# Patient Record
Sex: FEMALE | Race: WHITE | NOT HISPANIC OR LATINO | URBAN - METROPOLITAN AREA
[De-identification: names, ages, dates, MRNs, and addresses within clinical notes are randomized per-mention and may not be internally consistent; named-entity substitution may affect disease eponyms.]

---

## 2020-02-20 ENCOUNTER — APPOINTMENT (OUTPATIENT)
Dept: WOMENS IMAGING | Facility: HOSPITAL | Age: 60
End: 2020-02-20

## 2020-02-20 PROCEDURE — 77063 BREAST TOMOSYNTHESIS BI: CPT | Performed by: RADIOLOGY

## 2020-02-20 PROCEDURE — 77067 SCR MAMMO BI INCL CAD: CPT | Performed by: RADIOLOGY

## 2022-06-16 ENCOUNTER — APPOINTMENT (OUTPATIENT)
Dept: WOMENS IMAGING | Facility: HOSPITAL | Age: 62
End: 2022-06-16

## 2022-06-16 PROCEDURE — 77067 SCR MAMMO BI INCL CAD: CPT | Performed by: RADIOLOGY

## 2022-06-16 PROCEDURE — 77063 BREAST TOMOSYNTHESIS BI: CPT | Performed by: RADIOLOGY

## 2022-07-07 ENCOUNTER — APPOINTMENT (OUTPATIENT)
Dept: WOMENS IMAGING | Facility: HOSPITAL | Age: 62
End: 2022-07-07

## 2022-07-07 PROCEDURE — G0279 TOMOSYNTHESIS, MAMMO: HCPCS | Performed by: RADIOLOGY

## 2022-07-07 PROCEDURE — 77065 DX MAMMO INCL CAD UNI: CPT | Performed by: RADIOLOGY

## 2022-07-07 PROCEDURE — 77061 BREAST TOMOSYNTHESIS UNI: CPT | Performed by: RADIOLOGY

## 2022-07-07 PROCEDURE — 76642 ULTRASOUND BREAST LIMITED: CPT | Performed by: RADIOLOGY

## 2022-09-02 ENCOUNTER — OFFICE VISIT (OUTPATIENT)
Dept: INTERNAL MEDICINE | Facility: CLINIC | Age: 62
End: 2022-09-02

## 2022-09-02 VITALS
HEART RATE: 78 BPM | OXYGEN SATURATION: 98 % | RESPIRATION RATE: 16 BRPM | HEIGHT: 65 IN | TEMPERATURE: 97 F | DIASTOLIC BLOOD PRESSURE: 80 MMHG | SYSTOLIC BLOOD PRESSURE: 118 MMHG | BODY MASS INDEX: 33.15 KG/M2 | WEIGHT: 199 LBS

## 2022-09-02 DIAGNOSIS — L98.9 LESION OF FACE: ICD-10-CM

## 2022-09-02 DIAGNOSIS — E78.5 HYPERLIPIDEMIA, UNSPECIFIED HYPERLIPIDEMIA TYPE: ICD-10-CM

## 2022-09-02 DIAGNOSIS — Z72.0 TOBACCO ABUSE: ICD-10-CM

## 2022-09-02 DIAGNOSIS — E55.9 VITAMIN D DEFICIENCY: Primary | ICD-10-CM

## 2022-09-02 DIAGNOSIS — Z12.2 ENCOUNTER FOR SCREENING FOR LUNG CANCER: ICD-10-CM

## 2022-09-02 DIAGNOSIS — R73.03 PREDIABETES: ICD-10-CM

## 2022-09-02 DIAGNOSIS — Z12.11 SCREENING FOR COLON CANCER: ICD-10-CM

## 2022-09-02 PROBLEM — M48.02 CERVICAL SPINAL STENOSIS: Status: ACTIVE | Noted: 2022-09-02

## 2022-09-02 PROBLEM — K21.9 GERD (GASTROESOPHAGEAL REFLUX DISEASE): Status: ACTIVE | Noted: 2022-09-02

## 2022-09-02 PROBLEM — M50.30 DDD (DEGENERATIVE DISC DISEASE), CERVICAL: Status: ACTIVE | Noted: 2022-09-02

## 2022-09-02 PROBLEM — H25.13 AGE-RELATED NUCLEAR CATARACT OF BOTH EYES: Status: ACTIVE | Noted: 2021-10-12

## 2022-09-02 PROCEDURE — 99203 OFFICE O/P NEW LOW 30 MIN: CPT | Performed by: NURSE PRACTITIONER

## 2022-09-02 RX ORDER — LATANOPROST 50 UG/ML
1 SOLUTION/ DROPS OPHTHALMIC DAILY
COMMUNITY
Start: 2015-03-30

## 2022-09-02 RX ORDER — ERGOCALCIFEROL 1.25 MG/1
50000 CAPSULE ORAL WEEKLY
COMMUNITY
End: 2022-09-02

## 2022-09-02 RX ORDER — FAMOTIDINE 10 MG
1 TABLET ORAL 2 TIMES DAILY
COMMUNITY

## 2022-09-02 NOTE — PROGRESS NOTES
Subjective   Juli Monteiro is a 61 y.o. female. Patient is here today for   Chief Complaint   Patient presents with   • Vitamin D Deficiency   • Elevated A1C   • Hyperlipidemia          Vitals:    09/02/22 0900   BP: 118/80   Pulse: 78   Resp: 16   Temp: 97 °F (36.1 °C)   SpO2: 98%     Body mass index is 33.12 kg/m².  The following portions of the patient's history were reviewed and updated as appropriate: allergies, current medications, past family history, past medical history, past social history, past surgical history and problem list.    Past Medical History:   Diagnosis Date   • Hyperlipidemia       Allergies   Allergen Reactions   • Celecoxib Hives   • Doxycycline Nausea And Vomiting   • Sulfa Antibiotics Other (See Comments)     Allergic to Celebrex  Other reaction(s): Other (See Comments)  Allergic to Celebrex        Social History     Socioeconomic History   • Marital status: Unknown   Tobacco Use   • Smoking status: Current Every Day Smoker     Packs/day: 1.00     Years: 40.00     Pack years: 40.00     Types: Cigarettes   • Smokeless tobacco: Never Used   Substance and Sexual Activity   • Alcohol use: Never        Current Outpatient Medications:   •  famotidine (PEPCID) 10 MG tablet, Take 1 tablet by mouth 2 (Two) Times a Day., Disp: , Rfl:   •  latanoprost (XALATAN) 0.005 % ophthalmic solution, Apply 1 drop to eye(s) as directed by provider Daily., Disp: , Rfl:   •  vitamin D3 125 MCG (5000 UT) capsule capsule, Take 5,000 Units by mouth Daily., Disp: , Rfl:      Objective     History of Present Illness  Juli is a 61 year old female new patient who is here to establish care. She has not seen a PCP in many years but previously saw Dr Oseguera and was last seen in 2015. She established care with gyn at [facility] and had labs drawn a few months ago. A1C was in the prediabetic range, cholesterol was elevated, and vitamin d was low. She was referred to primary care for further eval. I do not have records to  review. Pt was given vitamin D weekly for 8 weeks, then started on daily otc vitamin d. I do not have the results to review.  She started a diabetic diet and lost 16 lbs.   She is a long time smoker and wants to quit but is not ready yet. Discussed smoking cessation.   She would like a referral to dermatology for lesion on her face and neck and for a skin check     Review of Systems   Constitutional: Negative.    HENT: Positive for congestion, postnasal drip and rhinorrhea.    Respiratory: Negative.    Cardiovascular: Negative.    Gastrointestinal: Negative.    Endocrine: Negative.    Genitourinary: Negative.    Allergic/Immunologic: Positive for environmental allergies.   Neurological: Positive for numbness (right foot ).       Physical Exam  Vitals and nursing note reviewed.   Constitutional:       General: She is not in acute distress.     Appearance: Normal appearance. She is well-developed and well-groomed.   Cardiovascular:      Rate and Rhythm: Normal rate and regular rhythm.   Pulmonary:      Effort: Pulmonary effort is normal.      Breath sounds: Normal breath sounds.   Neurological:      Mental Status: She is alert.   Psychiatric:         Behavior: Behavior is cooperative.         ASSESSMENT     Problems Addressed this Visit     Tobacco abuse    Relevant Orders     CT Chest Low Dose Cancer Screening WO      Other Visit Diagnoses     Vitamin D deficiency    -  Primary    Prediabetes        Hyperlipidemia, unspecified hyperlipidemia type        Encounter for screening for lung cancer        Relevant Orders     CT Chest Low Dose Cancer Screening WO    Screening for colon cancer        Relevant Orders    Ambulatory Referral to General Surgery    Lesion of face        Relevant Orders    Ambulatory Referral to Dermatology      Diagnoses       Codes Comments    Vitamin D deficiency    -  Primary ICD-10-CM: E55.9  ICD-9-CM: 268.9     Prediabetes     ICD-10-CM: R73.03  ICD-9-CM: 790.29     Hyperlipidemia,  unspecified hyperlipidemia type     ICD-10-CM: E78.5  ICD-9-CM: 272.4     Tobacco abuse     ICD-10-CM: Z72.0  ICD-9-CM: 305.1     Encounter for screening for lung cancer     ICD-10-CM: Z12.2  ICD-9-CM: V76.0     Screening for colon cancer     ICD-10-CM: Z12.11  ICD-9-CM: V76.51     Lesion of face     ICD-10-CM: L98.9  ICD-9-CM: 709.9           PLAN  Will obtain labs , OV, and mammogram from [facility]  Discussed smoking cessation  Continue otc vitamin d   Recommend exercise as tolerated  Continue diabetic diet  Colonoscopy screening referral placed  Referred to dermatology for skin eval per patient request    Return in about 6 months (around 3/2/2023) for Annual physical, with labs. including vitamin d , a1c, lipid panel, cmp, cbc , UA, and hepatitis c screening

## 2022-09-07 ENCOUNTER — TELEPHONE (OUTPATIENT)
Dept: INTERNAL MEDICINE | Facility: CLINIC | Age: 62
End: 2022-09-07

## 2022-09-07 NOTE — TELEPHONE ENCOUNTER
----- Message from MANDEEP Thomas sent at 9/7/2022 11:39 AM EDT -----  Reviewed mammogram from [facility] , recommend a 6 month follow up diagnostic mammogram. Please make sure the patient has a scheduled follow up with [facility]

## 2022-09-15 ENCOUNTER — TELEPHONE (OUTPATIENT)
Dept: INTERNAL MEDICINE | Facility: CLINIC | Age: 62
End: 2022-09-15

## 2022-09-15 ENCOUNTER — HOSPITAL ENCOUNTER (OUTPATIENT)
Dept: CT IMAGING | Facility: HOSPITAL | Age: 62
Discharge: HOME | End: 2022-09-15
Admitting: NURSE PRACTITIONER

## 2022-09-15 PROCEDURE — 71271 CT THORAX LUNG CANCER SCR C-: CPT

## 2022-09-15 NOTE — TELEPHONE ENCOUNTER
Please call Juli and let her know that low dose CT screening showed No suspicious findings for pulmonary malignancy. There was a small 2mm pulmonary nodule in the right middle lobe. Recommend low-dose  screening in one year

## 2023-01-10 ENCOUNTER — APPOINTMENT (OUTPATIENT)
Dept: WOMENS IMAGING | Facility: HOSPITAL | Age: 63
End: 2023-01-10
Payer: COMMERCIAL

## 2023-01-10 PROCEDURE — 76642 ULTRASOUND BREAST LIMITED: CPT | Performed by: RADIOLOGY

## 2023-01-10 PROCEDURE — 77065 DX MAMMO INCL CAD UNI: CPT | Performed by: RADIOLOGY

## 2023-01-10 PROCEDURE — 77061 BREAST TOMOSYNTHESIS UNI: CPT | Performed by: RADIOLOGY

## 2023-01-10 PROCEDURE — G0279 TOMOSYNTHESIS, MAMMO: HCPCS | Performed by: RADIOLOGY

## 2023-01-27 ENCOUNTER — OFFICE VISIT (OUTPATIENT)
Dept: INTERNAL MEDICINE | Facility: CLINIC | Age: 63
End: 2023-01-27
Payer: COMMERCIAL

## 2023-01-27 VITALS
HEIGHT: 65 IN | SYSTOLIC BLOOD PRESSURE: 118 MMHG | OXYGEN SATURATION: 98 % | DIASTOLIC BLOOD PRESSURE: 72 MMHG | WEIGHT: 191.4 LBS | HEART RATE: 82 BPM | BODY MASS INDEX: 31.89 KG/M2 | TEMPERATURE: 98.3 F

## 2023-01-27 DIAGNOSIS — Z72.0 TOBACCO ABUSE: ICD-10-CM

## 2023-01-27 DIAGNOSIS — R05.9 COUGH, UNSPECIFIED TYPE: Primary | ICD-10-CM

## 2023-01-27 LAB
EXPIRATION DATE: NORMAL
FLUAV AG UPPER RESP QL IA.RAPID: NOT DETECTED
FLUBV AG UPPER RESP QL IA.RAPID: NOT DETECTED
INTERNAL CONTROL: NORMAL
Lab: NORMAL
SARS-COV-2 AG UPPER RESP QL IA.RAPID: NOT DETECTED

## 2023-01-27 PROCEDURE — 99214 OFFICE O/P EST MOD 30 MIN: CPT | Performed by: STUDENT IN AN ORGANIZED HEALTH CARE EDUCATION/TRAINING PROGRAM

## 2023-01-27 PROCEDURE — 87428 SARSCOV & INF VIR A&B AG IA: CPT | Performed by: STUDENT IN AN ORGANIZED HEALTH CARE EDUCATION/TRAINING PROGRAM

## 2023-01-27 RX ORDER — VARENICLINE TARTRATE 25 MG
KIT ORAL
Qty: 53 TABLET | Refills: 0 | Status: SHIPPED | OUTPATIENT
Start: 2023-01-27

## 2023-01-27 RX ORDER — ALBUTEROL SULFATE 90 UG/1
2 AEROSOL, METERED RESPIRATORY (INHALATION) EVERY 4 HOURS PRN
Qty: 6.7 G | Refills: 0 | Status: SHIPPED | OUTPATIENT
Start: 2023-01-27

## 2023-01-27 RX ORDER — BENZONATATE 100 MG/1
100 CAPSULE ORAL 3 TIMES DAILY PRN
Qty: 30 CAPSULE | Refills: 0 | Status: SHIPPED | OUTPATIENT
Start: 2023-01-27 | End: 2023-02-16

## 2023-01-27 NOTE — PROGRESS NOTES
"  Andres Casarez M.D.  Internal Medicine  [facility]  [address], [address]  [place] [address]  [phone]      Chief Complaint  Cough, Nasal Congestion, and Sore Throat    SUBJECTIVE    History of Present Illness    Juli Monteiro is a 62 y.o. female with obesity, tobacco use who presents to the office today as an established patient of Ethel KAUFMAN here today for an acute care visit.     Symptoms started on Wednesday. Symptoms worsened Wednesday night with cough, nasal congestion, sore throat, headache. She is concerned she will get bronchitis and needs a Z-pack. She felt sh had a fever, did not feel well. Yesterday she had diarrhea. She has \"immature sinuses\". She presents today for yellow mucus, cough, congestion, headache. She is taking Dayquil, nyquil, allergy medicine. This helps for 2-3 hours. She avoids NSAIDs because of AGUAYO. States mucinex makes things worse.    Left foot gets numb at night on the top. Worse if she has sugar.  She thinks this is from her pre-diabetes. She is interested in starting metformin and wants to talk to her PCP about this. .     She smokes 1 ppd. She wants to quit by her birthday ([birth year]). She has never had success with quitting before. Nicotine patches and gum did not help. Interested in Chantix.       Review of Systems    Allergies   Allergen Reactions   • Celecoxib Hives   • Doxycycline Nausea And Vomiting   • Sulfa Antibiotics Other (See Comments)     Allergic to Celebrex  Other reaction(s): Other (See Comments)  Allergic to Celebrex          Outpatient Medications Marked as Taking for the 1/27/23 encounter (Office Visit) with Andres Casarez MD   Medication Sig Dispense Refill   • famotidine (PEPCID) 10 MG tablet Take 1 tablet by mouth 2 (Two) Times a Day.     • latanoprost (XALATAN) 0.005 % ophthalmic solution Apply 1 drop to eye(s) as directed by provider Daily.     • vitamin D3 125 MCG (5000 UT) capsule capsule Take 5,000 Units by mouth Daily.   " "       Past Medical History:   Diagnosis Date   • Cataract    • Cholelithiasis 2011    removed   • History of medical problems pcos   • Hyperlipidemia    • Irritable bowel syndrome    • Visual impairment      Past Surgical History:   Procedure Laterality Date   • APPENDECTOMY     •  SECTION     • CHOLECYSTECTOMY     • COLONOSCOPY     • EYE SURGERY       Family History   Problem Relation Age of Onset   • Cancer Mother    • Vision loss Mother    • Cancer Father    • Hearing loss Father    • Heart disease Father    • Vision loss Maternal Grandmother     reports that she has been smoking cigarettes. She has a 40.00 pack-year smoking history. She has never used smokeless tobacco. She reports that she does not currently use alcohol. She reports that she does not use drugs.    OBJECTIVE    Vital Signs:   /72   Pulse 82   Temp 98.3 °F (36.8 °C)   Ht 165.1 cm (65\")   Wt 86.8 kg (191 lb 6.4 oz)   SpO2 98%   BMI 31.85 kg/m²     Physical Exam  Constitutional:       Appearance: Normal appearance. She is normal weight.   HENT:      Right Ear: Tympanic membrane normal.      Left Ear: Tympanic membrane normal.      Nose: Nose normal.      Mouth/Throat:      Mouth: Mucous membranes are moist.      Pharynx: Oropharynx is clear. No posterior oropharyngeal erythema.   Cardiovascular:      Rate and Rhythm: Normal rate and regular rhythm.      Heart sounds: Normal heart sounds. No murmur heard.  Pulmonary:      Effort: Pulmonary effort is normal.      Breath sounds: Normal breath sounds.   Skin:     General: Skin is warm and dry.   Neurological:      Mental Status: She is alert.   Psychiatric:         Mood and Affect: Mood normal.         Behavior: Behavior normal.         Thought Content: Thought content normal.            The following data was reviewed by: Andres Casarez MD on 2023:    No recent lab data to review            Data reviewed: Previous PCP notes             ASSESSMENT & PLAN "     Diagnoses and all orders for this visit:    1. Cough, unspecified type (Primary)  -     POCT SARS-CoV-2 Antigen ANGEL + Flu  -     benzonatate (Tessalon Perles) 100 MG capsule; Take 1 capsule by mouth 3 (Three) Times a Day As Needed for Cough.  Dispense: 30 capsule; Refill: 0  -     albuterol sulfate  (90 Base) MCG/ACT inhaler; Inhale 2 puffs Every 4 (Four) Hours As Needed for Wheezing.  Dispense: 6.7 g; Refill: 0    2. Tobacco abuse  -     Varenicline Tartrate 0.5 MG X 11 & 1 MG X 42 tablet; Take 0.5 mg one daily on days 1-3 and and 0.5 mg twice daily on days 4-7.Then 1 mg twice daily for a total of 12 weeks.  Dispense: 53 tablet; Refill: 0      Discussed expected course for upper respiratory infection.  Discussed that this will likely improve on its own without antibiotics.  If patient has new or worsening symptoms or failure symptoms to improve she will reach out to us.  Patient is interested in smoking cessation today for her health.  She would like to quit by her birthday next year.  She is interested in Chantix.    Health Maintenance Due   Topic Date Due   • COLORECTAL CANCER SCREENING  Never done   • Pneumococcal Vaccine 0-64 (1 - PCV) Never done   • ZOSTER VACCINE (2 of 3) 11/08/2016   • INFLUENZA VACCINE  08/01/2022   • HEPATITIS C SCREENING  Never done   • ANNUAL PHYSICAL  Never done   • PAP SMEAR  Never done        Follow Up  No follow-ups on file.    Patient/family had no further questions at this time and verbalized understanding of the plan discussed today.

## 2023-02-06 DIAGNOSIS — R73.03 PREDIABETES: ICD-10-CM

## 2023-02-06 DIAGNOSIS — Z00.00 ANNUAL PHYSICAL EXAM: ICD-10-CM

## 2023-02-06 DIAGNOSIS — E78.5 HYPERLIPIDEMIA, UNSPECIFIED HYPERLIPIDEMIA TYPE: ICD-10-CM

## 2023-02-06 DIAGNOSIS — E55.9 VITAMIN D DEFICIENCY: Primary | ICD-10-CM

## 2023-02-16 ENCOUNTER — OFFICE VISIT (OUTPATIENT)
Dept: INTERNAL MEDICINE | Facility: CLINIC | Age: 63
End: 2023-02-16
Payer: COMMERCIAL

## 2023-02-16 VITALS
TEMPERATURE: 98.3 F | SYSTOLIC BLOOD PRESSURE: 112 MMHG | WEIGHT: 189.8 LBS | OXYGEN SATURATION: 98 % | BODY MASS INDEX: 31.62 KG/M2 | DIASTOLIC BLOOD PRESSURE: 62 MMHG | HEART RATE: 81 BPM | HEIGHT: 65 IN

## 2023-02-16 DIAGNOSIS — Z12.11 COLON CANCER SCREENING: ICD-10-CM

## 2023-02-16 DIAGNOSIS — R73.03 PREDIABETES: Primary | ICD-10-CM

## 2023-02-16 DIAGNOSIS — E78.2 MODERATE MIXED HYPERLIPIDEMIA NOT REQUIRING STATIN THERAPY: ICD-10-CM

## 2023-02-16 DIAGNOSIS — K21.9 GASTROESOPHAGEAL REFLUX DISEASE, UNSPECIFIED WHETHER ESOPHAGITIS PRESENT: ICD-10-CM

## 2023-02-16 DIAGNOSIS — J20.9 ACUTE BRONCHITIS, UNSPECIFIED ORGANISM: ICD-10-CM

## 2023-02-16 PROCEDURE — 99214 OFFICE O/P EST MOD 30 MIN: CPT | Performed by: NURSE PRACTITIONER

## 2023-02-16 RX ORDER — AZITHROMYCIN 250 MG/1
TABLET, FILM COATED ORAL
Qty: 6 TABLET | Refills: 0 | Status: SHIPPED | OUTPATIENT
Start: 2023-02-16

## 2023-02-16 NOTE — PROGRESS NOTES
Subjective   Juli Monteiro is a 62 y.o. female. Patient is here today for   Chief Complaint   Patient presents with   • Annual Exam     CPE. Review of Medical Issues          Vitals:    02/16/23 1502   BP: 112/62   Pulse: 81   Temp: 98.3 °F (36.8 °C)   SpO2: 98%     Body mass index is 31.58 kg/m².    The following portions of the patient's history were reviewed and updated as appropriate: allergies, current medications, past family history, past medical history, past social history, past surgical history and problem list.    Past Medical History:   Diagnosis Date   • Cataract 2017   • Cholelithiasis 2011    removed   • History of medical problems pcos   • Hyperlipidemia    • Irritable bowel syndrome 2009   • Visual impairment 2003      Allergies   Allergen Reactions   • Celecoxib Hives   • Doxycycline Nausea And Vomiting   • Sulfa Antibiotics Other (See Comments)     Allergic to Celebrex  Other reaction(s): Other (See Comments)  Allergic to Celebrex        Social History     Socioeconomic History   • Marital status:    Tobacco Use   • Smoking status: Every Day     Packs/day: 1.00     Years: 40.00     Pack years: 40.00     Types: Cigarettes   • Smokeless tobacco: Never   Substance and Sexual Activity   • Alcohol use: Not Currently     Comment: Social only   • Drug use: Never   • Sexual activity: Not Currently     Partners: Male     Comment: too old        Current Outpatient Medications:   •  albuterol sulfate  (90 Base) MCG/ACT inhaler, Inhale 2 puffs Every 4 (Four) Hours As Needed for Wheezing., Disp: 6.7 g, Rfl: 0  •  famotidine (PEPCID) 10 MG tablet, Take 1 tablet by mouth 2 (Two) Times a Day., Disp: , Rfl:   •  latanoprost (XALATAN) 0.005 % ophthalmic solution, Apply 1 drop to eye(s) as directed by provider Daily., Disp: , Rfl:   •  Varenicline Tartrate 0.5 MG X 11 & 1 MG X 42 tablet, Take 0.5 mg one daily on days 1-3 and and 0.5 mg twice daily on days 4-7.Then 1 mg twice daily for a total of 12  weeks., Disp: 53 tablet, Rfl: 0  •  vitamin D3 125 MCG (5000 UT) capsule capsule, Take 5,000 Units by mouth Daily., Disp: , Rfl:        Objective   History of Present Illness   Juli Monteiro 62 y.o. female who presents for an Annual Wellness Visit.  she has a history of   Patient Active Problem List   Diagnosis   • Age-related nuclear cataract of both eyes   • AR (allergic rhinitis)   • DDD (degenerative disc disease), cervical   • Cervical spinal stenosis   • GERD (gastroesophageal reflux disease)   • AGUAYO (nonalcoholic steatohepatitis)   • S/P appy   • S/P laparoscopic cholecystectomy   • Tobacco abuse   .  Labs results discussed in detail with the patient.  Plan to update vaccines if needed today.    Health Habits:  Dental Exam. {status:72733}  Eye Exam. {status:54975}  Exercise: {numbers; 0-10:27393} times/week.  Current exercise activities include: {misc; exercise types:88870}    Preventative counseling  Discussed face to face the importance of healthy diet and exercise.     Lab Results (most recent)     None            Review of Systems    Physical Exam    ASSESSMENT       Problems Addressed this Visit    None  Visit Diagnoses     Annual physical exam    -  Primary      Diagnoses       Codes Comments    Annual physical exam    -  Primary ICD-10-CM: Z00.00  ICD-9-CM: V70.0           PLAN    There are no Patient Instructions on file for this visit.    No follow-ups on file.

## 2023-02-16 NOTE — PROGRESS NOTES
Subjective   Juli Monteiro is a 62 y.o. female. Patient is here today for   Chief Complaint   Patient presents with   • Prediabetes   • Cough          Vitals:    02/16/23 1502   BP: 112/62   Pulse: 81   Temp: 98.3 °F (36.8 °C)   SpO2: 98%     Body mass index is 31.58 kg/m².  The following portions of the patient's history were reviewed and updated as appropriate: allergies, current medications, past family history, past medical history, past social history, past surgical history and problem list.    Past Medical History:   Diagnosis Date   • Cataract 2017   • Cholelithiasis 2011    removed   • History of medical problems pcos   • Hyperlipidemia    • Irritable bowel syndrome 2009   • Visual impairment 2003      Allergies   Allergen Reactions   • Celecoxib Hives   • Doxycycline Nausea And Vomiting   • Sulfa Antibiotics Other (See Comments)     Allergic to Celebrex  Other reaction(s): Other (See Comments)  Allergic to Celebrex        Social History     Socioeconomic History   • Marital status:    Tobacco Use   • Smoking status: Every Day     Packs/day: 1.00     Years: 40.00     Pack years: 40.00     Types: Cigarettes   • Smokeless tobacco: Never   Substance and Sexual Activity   • Alcohol use: Not Currently     Comment: Social only   • Drug use: Never   • Sexual activity: Not Currently     Partners: Male     Comment: too old        Current Outpatient Medications:   •  albuterol sulfate  (90 Base) MCG/ACT inhaler, Inhale 2 puffs Every 4 (Four) Hours As Needed for Wheezing., Disp: 6.7 g, Rfl: 0  •  famotidine (PEPCID) 10 MG tablet, Take 1 tablet by mouth 2 (Two) Times a Day., Disp: , Rfl:   •  latanoprost (XALATAN) 0.005 % ophthalmic solution, Apply 1 drop to eye(s) as directed by provider Daily., Disp: , Rfl:   •  Varenicline Tartrate 0.5 MG X 11 & 1 MG X 42 tablet, Take 0.5 mg one daily on days 1-3 and and 0.5 mg twice daily on days 4-7.Then 1 mg twice daily for a total of 12 weeks., Disp: 53 tablet, Rfl:  0  •  vitamin D3 125 MCG (5000 UT) capsule capsule, Take 5,000 Units by mouth Daily., Disp: , Rfl:   •  azithromycin (Zithromax Z-Rachid) 250 MG tablet, Take 2 tablets the first day, then 1 tablet daily for 4 days., Disp: 6 tablet, Rfl: 0     Objective     History of Present Illness  Juli is a 62 year old female patient who is here for a follow up for Prediabetes and to discuss lab results. She has been eating a healthy diet and exercising. She is working on losing weight.   She has had a cough and chest congestion for over 3 weeks. She has wheezing and shortness of breath. She has taken albuterol, tessalon and mucinex with some relief.   I reviewed labs with her in detail  Results Encounter on 02/06/2023   Component Date Value Ref Range Status   • WBC 02/09/2023 9.36  3.40 - 10.80 10*3/mm3 Final   • RBC 02/09/2023 5.25  3.77 - 5.28 10*6/mm3 Final   • Hemoglobin 02/09/2023 16.0 (H)  12.0 - 15.9 g/dL Final   • Hematocrit 02/09/2023 46.9 (H)  34.0 - 46.6 % Final   • MCV 02/09/2023 89.3  79.0 - 97.0 fL Final   • MCH 02/09/2023 30.5  26.6 - 33.0 pg Final   • MCHC 02/09/2023 34.1  31.5 - 35.7 g/dL Final   • RDW 02/09/2023 11.7 (L)  12.3 - 15.4 % Final   • Platelets 02/09/2023 295  140 - 450 10*3/mm3 Final   • Neutrophil Rel % 02/09/2023 69.8  42.7 - 76.0 % Final   • Lymphocyte Rel % 02/09/2023 19.2 (L)  19.6 - 45.3 % Final   • Monocyte Rel % 02/09/2023 7.4  5.0 - 12.0 % Final   • Eosinophil Rel % 02/09/2023 2.8  0.3 - 6.2 % Final   • Basophil Rel % 02/09/2023 0.6  0.0 - 1.5 % Final   • Neutrophils Absolute 02/09/2023 6.53  1.70 - 7.00 10*3/mm3 Final   • Lymphocytes Absolute 02/09/2023 1.80  0.70 - 3.10 10*3/mm3 Final   • Monocytes Absolute 02/09/2023 0.69  0.10 - 0.90 10*3/mm3 Final   • Eosinophils Absolute 02/09/2023 0.26  0.00 - 0.40 10*3/mm3 Final   • Basophils Absolute 02/09/2023 0.06  0.00 - 0.20 10*3/mm3 Final   • Immature Granulocyte Rel % 02/09/2023 0.2  0.0 - 0.5 % Final   • Immature Grans Absolute 02/09/2023 0.02   0.00 - 0.05 10*3/mm3 Final   • nRBC 02/09/2023 0.0  0.0 - 0.2 /100 WBC Final   • Glucose 02/09/2023 109 (H)  65 - 99 mg/dL Final   • BUN 02/09/2023 8  8 - 23 mg/dL Final   • Creatinine 02/09/2023 0.76  0.57 - 1.00 mg/dL Final   • EGFR Result 02/09/2023 89.3  >60.0 mL/min/1.73 Final    Comment: GFR Normal >60  Chronic Kidney Disease <60  Kidney Failure <15     • BUN/Creatinine Ratio 02/09/2023 10.5  7.0 - 25.0 Final   • Sodium 02/09/2023 142  136 - 145 mmol/L Final   • Potassium 02/09/2023 4.6  3.5 - 5.2 mmol/L Final   • Chloride 02/09/2023 104  98 - 107 mmol/L Final   • Total CO2 02/09/2023 27.0  22.0 - 29.0 mmol/L Final   • Calcium 02/09/2023 9.6  8.6 - 10.5 mg/dL Final   • Total Protein 02/09/2023 6.5  6.0 - 8.5 g/dL Final   • Albumin 02/09/2023 4.2  3.5 - 5.2 g/dL Final   • Globulin 02/09/2023 2.3  gm/dL Final   • A/G Ratio 02/09/2023 1.8  g/dL Final   • Total Bilirubin 02/09/2023 0.4  0.0 - 1.2 mg/dL Final   • Alkaline Phosphatase 02/09/2023 98  39 - 117 U/L Final   • AST (SGOT) 02/09/2023 24  1 - 32 U/L Final   • ALT (SGPT) 02/09/2023 22  1 - 33 U/L Final   • Total Cholesterol 02/09/2023 209 (H)  0 - 200 mg/dL Final    Comment: Cholesterol Reference Ranges  (U.S. Department of Health and Human Services ATP III  Classifications)  Desirable          <200 mg/dL  Borderline High    200-239 mg/dL  High Risk          >240 mg/dL  Triglyceride Reference Ranges  (U.S. Department of Health and Human Services ATP III  Classifications)  Normal           <150 mg/dL  Borderline High  150-199 mg/dL  High             200-499 mg/dL  Very High        >500 mg/dL  HDL Reference Ranges  (U.S. Department of Health and Human Services ATP III  Classifications)  Low     <40 mg/dl (major risk factor for CHD)  High    >60 mg/dl ('negative' risk factor for CHD)  LDL Reference Ranges  (U.S. Department of Health and Human Services ATP III  Classifications)  Optimal          <100 mg/dL  Near Optimal     100-129 mg/dL  Borderline High   130-159 mg/dL  High             160-189 mg/dL  Very High        >189 mg/dL     • Triglycerides 02/09/2023 108  0 - 150 mg/dL Final   • HDL Cholesterol 02/09/2023 47  40 - 60 mg/dL Final   • VLDL Cholesterol Omer 02/09/2023 19  5 - 40 mg/dL Final   • LDL Chol Calc (NIH) 02/09/2023 143 (H)  0 - 100 mg/dL Final   • Chol/HDL Ratio 02/09/2023 4.45   Final   • Specific Gravity, UA 02/09/2023 1.010  1.005 - 1.030 Final   • pH, UA 02/09/2023 7.0  5.0 - 8.0 Final   • Color, UA 02/09/2023 Yellow   Final    Comment: Urine microscopic not indicated.  REFERENCE RANGE: Yellow, Straw     • Appearance, UA 02/09/2023 Clear  Clear Final   • Leukocytes, UA 02/09/2023 Negative  Negative Final   • Protein 02/09/2023 Negative  Negative Final   • Glucose, UA 02/09/2023 Negative  Negative Final   • Ketones 02/09/2023 Negative  Negative Final   • Blood, UA 02/09/2023 Negative  Negative Final   • Bilirubin, UA 02/09/2023 Negative  Negative Final   • Urobilinogen, UA 02/09/2023 Comment   Final    Comment: 0.2 E.U./dL  REFERENCE RANGE: 0.2 - 1.0 E.U./dL     • Nitrite, UA 02/09/2023 Negative  Negative Final   • TSH 02/09/2023 2.950  0.270 - 4.200 uIU/mL Final   • 25 Hydroxy, Vitamin D 02/09/2023 47.4  30.0 - 100.0 ng/ml Final    Comment: Reference Range for Total Vitamin D 25(OH)  Deficiency <20.0 ng/mL  Insufficiency 21-29 ng/mL  Sufficiency  ng/mL  Toxicity >100 ng/ml     • Hemoglobin A1C 02/09/2023 5.80 (H)  4.80 - 5.60 % Final    Comment: Hemoglobin A1C Ranges:  Increased Risk for Diabetes  5.7% to 6.4%  Diabetes                     >= 6.5%  Diabetic Goal                < 7.0%     Office Visit on 01/27/2023   Component Date Value Ref Range Status   • SARS Antigen 01/27/2023 Not Detected  Not Detected, Presumptive Negative Final   • Influenza A Antigen ANGEL 01/27/2023 Not Detected  Not Detected Final   • Influenza B Antigen ANGEL 01/27/2023 Not Detected  Not Detected Final   • Internal Control 01/27/2023 Passed  Passed Final   • Lot Number  01/27/2023 [id number]   Final   • Expiration Date 01/27/2023 03/02/2023   Final         Review of Systems   Constitutional: Negative for fatigue and fever.   HENT: Negative.    Respiratory: Positive for cough, shortness of breath and wheezing. Negative for chest tightness.    Cardiovascular: Negative.    Gastrointestinal: Negative.         Heartburn, family history of medrano's esophagus    Genitourinary: Negative.    Musculoskeletal: Negative.    Neurological: Negative.      Juli Monteiro  reports that she has been smoking cigarettes. She has a 40.00 pack-year smoking history. She has never used smokeless tobacco.. I have educated her on the risk of diseases from using tobacco products such as cancer, COPD and heart disease.     I advised her to quit and she is willing to quit. We have discussed the following method/s for tobacco cessation:  Prescription Medicaiton.  Together we have set a quit date for 1 month from today.  She will follow up with me in 6 months or sooner to check on her progress.    I spent 3  minutes counseling the patient.         Physical Exam  Vitals and nursing note reviewed.   Constitutional:       General: She is not in acute distress.     Appearance: Normal appearance. She is well-developed and well-groomed.   HENT:      Head: Normocephalic.   Cardiovascular:      Rate and Rhythm: Normal rate and regular rhythm.   Pulmonary:      Effort: Pulmonary effort is normal.      Breath sounds: Examination of the right-upper field reveals rhonchi. Rhonchi present. No wheezing.   Skin:     General: Skin is warm and dry.   Neurological:      Mental Status: She is alert and oriented to person, place, and time.   Psychiatric:         Mood and Affect: Mood normal.         The 10-year ASCVD risk score (Ashkan CUEVAS, et al., 2019) is: 6.6%    Values used to calculate the score:      Age: 62 years      Sex: Female      Is Non- : No      Diabetic: No      Tobacco smoker: Yes      Systolic  Blood Pressure: 112 mmHg      Is BP treated: No      HDL Cholesterol: 47 mg/dL      Total Cholesterol: 209 mg/dL    ASSESSMENT     Problems Addressed this Visit     GERD (gastroesophageal reflux disease)    Relevant Orders    Ambulatory Referral to General Surgery   Other Visit Diagnoses     Prediabetes    -  Primary    Moderate mixed hyperlipidemia not requiring statin therapy        Colon cancer screening        Relevant Orders    Ambulatory Referral to General Surgery    Acute bronchitis, unspecified organism          Diagnoses       Codes Comments    Prediabetes    -  Primary ICD-10-CM: R73.03  ICD-9-CM: 790.29     Moderate mixed hyperlipidemia not requiring statin therapy     ICD-10-CM: E78.2  ICD-9-CM: 272.2     Colon cancer screening     ICD-10-CM: Z12.11  ICD-9-CM: V76.51     Gastroesophageal reflux disease, unspecified whether esophagitis present     ICD-10-CM: K21.9  ICD-9-CM: 530.81     Acute bronchitis, unspecified organism     ICD-10-CM: J20.9  ICD-9-CM: 466.0           PLAN  1. Prediabetes- a1c is 5.8 continue with diet, exercise ,   2. HLD - TC is 209 and LDL is 143. 10 year cardiac risk is 6.6% . Discussed starting a statin due to family h/o early CAD but she declined.   3. Discussed smoking cessation- has an rx for chantix  4. Will refer to general surgery in [place] for colonoscopy screening, pt would also like an EGD done at that time.   5. Bronchitis- rx for zpak given, continue mucinex as needed  Follow up if symptoms persist or worsen   Return in about 6 months (around 8/16/2023) for Next scheduled follow up, with labs. cmp, a1c, lipid panel

## 2023-07-12 ENCOUNTER — APPOINTMENT (OUTPATIENT)
Dept: WOMENS IMAGING | Facility: HOSPITAL | Age: 63
End: 2023-07-12
Payer: COMMERCIAL

## 2023-07-12 PROCEDURE — 77066 DX MAMMO INCL CAD BI: CPT | Performed by: RADIOLOGY

## 2023-07-12 PROCEDURE — G0279 TOMOSYNTHESIS, MAMMO: HCPCS | Performed by: RADIOLOGY

## 2023-07-12 PROCEDURE — 76642 ULTRASOUND BREAST LIMITED: CPT | Performed by: RADIOLOGY

## 2023-07-12 PROCEDURE — 77062 BREAST TOMOSYNTHESIS BI: CPT | Performed by: RADIOLOGY

## 2024-07-24 ENCOUNTER — LAB (OUTPATIENT)
Dept: LAB | Facility: HOSPITAL | Age: 64
End: 2024-07-24
Payer: COMMERCIAL

## 2024-07-24 ENCOUNTER — HOSPITAL ENCOUNTER (OUTPATIENT)
Dept: CT IMAGING | Facility: HOSPITAL | Age: 64
Discharge: HOME | End: 2024-07-24
Payer: COMMERCIAL

## 2024-07-24 ENCOUNTER — OFFICE VISIT (OUTPATIENT)
Dept: INTERNAL MEDICINE | Facility: CLINIC | Age: 64
End: 2024-07-24
Payer: COMMERCIAL

## 2024-07-24 VITALS
WEIGHT: 205 LBS | SYSTOLIC BLOOD PRESSURE: 134 MMHG | HEART RATE: 79 BPM | HEIGHT: 65 IN | TEMPERATURE: 96.4 F | OXYGEN SATURATION: 99 % | RESPIRATION RATE: 16 BRPM | DIASTOLIC BLOOD PRESSURE: 80 MMHG | BODY MASS INDEX: 34.16 KG/M2

## 2024-07-24 DIAGNOSIS — R10.11 RUQ PAIN: Primary | ICD-10-CM

## 2024-07-24 DIAGNOSIS — R10.31 ABDOMINAL PAIN, RLQ: ICD-10-CM

## 2024-07-24 DIAGNOSIS — R19.04 LEFT LOWER QUADRANT ABDOMINAL MASS: Primary | ICD-10-CM

## 2024-07-24 LAB
ALBUMIN SERPL-MCNC: 4.1 G/DL (ref 3.5–5.2)
ALBUMIN/GLOB SERPL: 1.4 G/DL
ALP SERPL-CCNC: 80 U/L (ref 39–117)
ALT SERPL W P-5'-P-CCNC: 21 U/L (ref 1–33)
AMYLASE SERPL-CCNC: 40 U/L (ref 28–100)
ANION GAP SERPL CALCULATED.3IONS-SCNC: 10.2 MMOL/L (ref 5–15)
AST SERPL-CCNC: 20 U/L (ref 1–32)
BASOPHILS # BLD AUTO: 0.06 10*3/MM3 (ref 0–0.2)
BASOPHILS NFR BLD AUTO: 0.8 % (ref 0–1.5)
BILIRUB BLD-MCNC: NEGATIVE MG/DL
BILIRUB SERPL-MCNC: <0.2 MG/DL (ref 0–1.2)
BUN SERPL-MCNC: 9 MG/DL (ref 8–23)
BUN/CREAT SERPL: 12.9 (ref 7–25)
CALCIUM SPEC-SCNC: 9.9 MG/DL (ref 8.6–10.5)
CHLORIDE SERPL-SCNC: 104 MMOL/L (ref 98–107)
CLARITY, POC: CLEAR
CO2 SERPL-SCNC: 25.8 MMOL/L (ref 22–29)
COLOR UR: YELLOW
CREAT SERPL-MCNC: 0.7 MG/DL (ref 0.57–1)
DEPRECATED RDW RBC AUTO: 38.6 FL (ref 37–54)
EGFRCR SERPLBLD CKD-EPI 2021: 97.9 ML/MIN/1.73
EOSINOPHIL # BLD AUTO: 0.21 10*3/MM3 (ref 0–0.4)
EOSINOPHIL NFR BLD AUTO: 2.7 % (ref 0.3–6.2)
ERYTHROCYTE [DISTWIDTH] IN BLOOD BY AUTOMATED COUNT: 11.7 % (ref 12.3–15.4)
EXPIRATION DATE: NORMAL
GLOBULIN UR ELPH-MCNC: 3 GM/DL
GLUCOSE SERPL-MCNC: 92 MG/DL (ref 65–99)
GLUCOSE UR STRIP-MCNC: NEGATIVE MG/DL
HCT VFR BLD AUTO: 46.9 % (ref 34–46.6)
HGB BLD-MCNC: 15.7 G/DL (ref 12–15.9)
HOLD SPECIMEN: NORMAL
IMM GRANULOCYTES # BLD AUTO: 0.03 10*3/MM3 (ref 0–0.05)
IMM GRANULOCYTES NFR BLD AUTO: 0.4 % (ref 0–0.5)
KETONES UR QL: NEGATIVE
LEUKOCYTE EST, POC: NEGATIVE
LIPASE SERPL-CCNC: 25 U/L (ref 13–60)
LYMPHOCYTES # BLD AUTO: 2.07 10*3/MM3 (ref 0.7–3.1)
LYMPHOCYTES NFR BLD AUTO: 26.5 % (ref 19.6–45.3)
Lab: NORMAL
MCH RBC QN AUTO: 30.3 PG (ref 26.6–33)
MCHC RBC AUTO-ENTMCNC: 33.5 G/DL (ref 31.5–35.7)
MCV RBC AUTO: 90.5 FL (ref 79–97)
MONOCYTES # BLD AUTO: 0.67 10*3/MM3 (ref 0.1–0.9)
MONOCYTES NFR BLD AUTO: 8.6 % (ref 5–12)
NEUTROPHILS NFR BLD AUTO: 4.78 10*3/MM3 (ref 1.7–7)
NEUTROPHILS NFR BLD AUTO: 61 % (ref 42.7–76)
NITRITE UR-MCNC: NEGATIVE MG/ML
NRBC BLD AUTO-RTO: 0 /100 WBC (ref 0–0.2)
PH UR: 6 [PH] (ref 5–8)
PLATELET # BLD AUTO: 272 10*3/MM3 (ref 140–450)
PMV BLD AUTO: 9.1 FL (ref 6–12)
POTASSIUM SERPL-SCNC: 4.2 MMOL/L (ref 3.5–5.2)
PROT SERPL-MCNC: 7.1 G/DL (ref 6–8.5)
PROT UR STRIP-MCNC: NEGATIVE MG/DL
RBC # BLD AUTO: 5.18 10*6/MM3 (ref 3.77–5.28)
RBC # UR STRIP: NEGATIVE /UL
SODIUM SERPL-SCNC: 140 MMOL/L (ref 136–145)
SP GR UR: 1.01 (ref 1–1.03)
UROBILINOGEN UR QL: NORMAL
WBC NRBC COR # BLD AUTO: 7.82 10*3/MM3 (ref 3.4–10.8)

## 2024-07-24 PROCEDURE — 25510000001 IOPAMIDOL 61 % SOLUTION: Performed by: INTERNAL MEDICINE

## 2024-07-24 PROCEDURE — 74177 CT ABD & PELVIS W/CONTRAST: CPT

## 2024-07-24 PROCEDURE — 82150 ASSAY OF AMYLASE: CPT

## 2024-07-24 PROCEDURE — 80053 COMPREHEN METABOLIC PANEL: CPT

## 2024-07-24 PROCEDURE — 99214 OFFICE O/P EST MOD 30 MIN: CPT | Performed by: INTERNAL MEDICINE

## 2024-07-24 PROCEDURE — 83690 ASSAY OF LIPASE: CPT

## 2024-07-24 PROCEDURE — 81003 URINALYSIS AUTO W/O SCOPE: CPT | Performed by: INTERNAL MEDICINE

## 2024-07-24 PROCEDURE — 36415 COLL VENOUS BLD VENIPUNCTURE: CPT

## 2024-07-24 PROCEDURE — 0 DIATRIZOATE MEGLUMINE & SODIUM PER 1 ML: Performed by: INTERNAL MEDICINE

## 2024-07-24 PROCEDURE — 85025 COMPLETE CBC W/AUTO DIFF WBC: CPT

## 2024-07-24 RX ADMIN — IOPAMIDOL 85 ML: 612 INJECTION, SOLUTION INTRAVENOUS at 12:13

## 2024-07-24 RX ADMIN — DIATRIZOATE MEGLUMINE AND DIATRIZOATE SODIUM 30 ML: 660; 100 LIQUID ORAL; RECTAL at 12:13

## 2024-07-24 NOTE — PROGRESS NOTES
Subjective     Juli Monteiro is a 63 y.o. female who presents with   Chief Complaint   Patient presents with    Flank Pain    Abdominal Pain       History of Present Illness     C/o RUQ pain.  Started one week.  Abrupt onset.  Radiates to the back.  Movement, breathing hurts it.  No fever.  No dysuria.  No difficulty urinating.      Review of Systems   Constitutional:  Negative for fever.   Gastrointestinal:  Positive for abdominal pain, constipation, diarrhea and nausea. Negative for vomiting.   Genitourinary:  Positive for frequency. Negative for dysuria and hematuria.   Musculoskeletal:  Positive for myalgias. Negative for arthralgias.       The following portions of the patient's history were reviewed and updated as appropriate: allergies, current medications and problem list.    Patient Active Problem List    Diagnosis Date Noted    DDD (degenerative disc disease), cervical 09/02/2022    Cervical spinal stenosis 09/02/2022    GERD (gastroesophageal reflux disease) 09/02/2022    Age-related nuclear cataract of both eyes 10/12/2021     Note Last Updated: 9/2/2022     Formatting of this note might be different from the original.  The cataracts are mild and have minimal impact on vision at this time.      AR (allergic rhinitis) 12/26/2013    Tobacco abuse 11/03/2013    AGUAYO (nonalcoholic steatohepatitis) 03/11/2013    S/P appy 03/11/2013     Note Last Updated: 9/2/2022     Formatting of this note might be different from the original.  1/5/09 Dr Santo      S/P laparoscopic cholecystectomy 03/11/2013     Note Last Updated: 9/2/2022     Formatting of this note might be different from the original.  10/10/11 Dr Sarabia         Current Outpatient Medications on File Prior to Visit   Medication Sig Dispense Refill    famotidine (PEPCID) 10 MG tablet Take 1 tablet by mouth 2 (Two) Times a Day.      vitamin D3 125 MCG (5000 UT) capsule capsule Take 1 capsule by mouth Daily.      [DISCONTINUED] albuterol sulfate   "(90 Base) MCG/ACT inhaler Inhale 2 puffs Every 4 (Four) Hours As Needed for Wheezing. 6.7 g 0    [DISCONTINUED] azithromycin (Zithromax Z-Rachid) 250 MG tablet Take 2 tablets the first day, then 1 tablet daily for 4 days. 6 tablet 0    [DISCONTINUED] latanoprost (XALATAN) 0.005 % ophthalmic solution Apply 1 drop to eye(s) as directed by provider Daily.      [DISCONTINUED] Varenicline Tartrate 0.5 MG X 11 & 1 MG X 42 tablet Take 0.5 mg one daily on days 1-3 and and 0.5 mg twice daily on days 4-7.Then 1 mg twice daily for a total of 12 weeks. 53 tablet 0     No current facility-administered medications on file prior to visit.       Objective     /80   Pulse 79   Temp 96.4 °F (35.8 °C) (Infrared)   Resp 16   Ht 165.1 cm (65\")   Wt 93 kg (205 lb)   SpO2 99%   BMI 34.11 kg/m²     Physical Exam  Constitutional:       Appearance: She is well-developed.   HENT:      Head: Normocephalic and atraumatic.   Pulmonary:      Effort: Pulmonary effort is normal.   Abdominal:      General: There is no distension.      Palpations: Abdomen is soft. There is no mass.      Tenderness: There is abdominal tenderness in the right upper quadrant and right lower quadrant. There is no guarding or rebound.   Neurological:      Mental Status: She is alert.     The following data was reviewed by: Latanya Coleman MD on 07/24/2024:  CMP          7/24/2024    10:16   CMP   Glucose 92    BUN 9    Creatinine 0.70    EGFR 97.9    Sodium 140    Potassium 4.2    Chloride 104    Calcium 9.9    Total Protein 7.1    Albumin 4.1    Globulin 3.0    Total Bilirubin <0.2    Alkaline Phosphatase 80    AST (SGOT) 20    ALT (SGPT) 21    Albumin/Globulin Ratio 1.4    BUN/Creatinine Ratio 12.9    Anion Gap 10.2      CBC          7/24/2024    10:16   CBC   WBC 7.82    RBC 5.18    Hemoglobin 15.7    Hematocrit 46.9    MCV 90.5    MCH 30.3    MCHC 33.5    RDW 11.7    Platelets 272      UA          7/24/2024    11:53   Urinalysis   Ketones, UA Negative  "   Leukocytes, UA Negative            Assessment & Plan       Discussion    Patient presents with severe RUQ and RLQ pain of unclear etiology.  Urine dip is unremarkable.  STAT labs were unremarkable.  Check CT abd/pelvis to further evaluate and rule out diverticulitis, kidney stone.         Future Appointments   Date Time Provider Department Center   7/24/2024  1:00 PM [facility]

## 2024-07-25 ENCOUNTER — TELEPHONE (OUTPATIENT)
Dept: INTERNAL MEDICINE | Facility: CLINIC | Age: 64
End: 2024-07-25
Payer: COMMERCIAL

## 2024-07-25 NOTE — TELEPHONE ENCOUNTER
----- Message from Latanya Coleman sent at 7/24/2024  5:19 PM EDT -----  I d/w patient.    Anterior to the left iliacus muscle there is stranding within the fat  that appears well-circumscribed and somewhat linear in configuration.  This measures approximately 3.7 x 2.4 cm in axial dimension and extends  approximately 8 cm craniocaudal dimension along the anterior margin of  the left iliac is an left iliopsoas musculature.    Referral is placed to general surgery for opinion about this mass.      I encouraged her to make a f/u appointment with Ethel Conley to f/u.  JANNETTE to Ethel.

## 2024-07-25 NOTE — TELEPHONE ENCOUNTER
Reviewed Dr Coleman's result note  She is also due for a follow up with labs in August   Please schedule patient

## 2024-08-07 ENCOUNTER — OFFICE VISIT (OUTPATIENT)
Dept: SURGERY | Facility: CLINIC | Age: 64
End: 2024-08-07
Payer: COMMERCIAL

## 2024-08-07 VITALS
SYSTOLIC BLOOD PRESSURE: 122 MMHG | HEIGHT: 65 IN | BODY MASS INDEX: 34.16 KG/M2 | WEIGHT: 205 LBS | DIASTOLIC BLOOD PRESSURE: 88 MMHG

## 2024-08-07 DIAGNOSIS — R19.00 RETROPERITONEAL MASS: Primary | ICD-10-CM

## 2024-08-07 DIAGNOSIS — R10.31 RIGHT LOWER QUADRANT ABDOMINAL PAIN: ICD-10-CM

## 2024-08-07 DIAGNOSIS — M25.552 HIP PAIN, LEFT: ICD-10-CM

## 2024-08-07 NOTE — PROGRESS NOTES
General Surgery History and Physical          Referring Provider: Latanya Coleman MD    Chief Complaint:    Abdominal pain and retroperitoneal mass    History of Present Illness:    Juli Monteiro is a 63 y.o. woman referred for right-sided abdominal pain and incidentally discovered left retroperitoneal mass.  She initially presented in late July to her PCP for abdominal pain that she has been having for approximately 2 weeks.  Her pain is located on the right side radiating to her back.  There is no associated nausea or vomiting.  Her pain began after eating red meat, has improved since then but not resolved.  She does report intermittent diarrhea, which she attributes to magnesium that she takes for eye problems.  The patient also reports some mild left hip discomfort on ambulation over the last 3 weeks, she denies any fall or injury.  Denies blood in her stool, change in stool caliber, unintentional weight loss, nausea, and vomiting.  She has a history of GERD, which is well-controlled with famotidine.  She had a colonoscopy in 2009 which was reportedly normal, EGD at that time demonstrated friable gastric mucosa.  She was diagnosed with nonalcoholic steatohepatitis in the past, and has had her gallbladder and appendix removed.  Her abdomen is soft with some focal tenderness in the right mid abdomen, there are no palpable hernias or masses.    Labs obtained by her primary care physician including a CBC, CMP and urinalysis were reviewed and normal.  CT scan performed on July 24 was reviewed and demonstrates a well-circumscribed 3.7 x 2.4 x 8 cm fatty mass anterior to the left iliopsoas and iliacus muscles.  There is no associated lymphadenopathy or abnormalities on the right side of the abdomen, specifically no diverticulosis, nephrolithiasis, hernias, or fluid collections. No prior cross-sectional imaging available for comparison.     Past Medical History:   Past Medical History:   Diagnosis Date    Cataract 2017     Cholelithiasis 2011    removed    History of medical problems pcos    Hyperlipidemia     Irritable bowel syndrome 2009    Visual impairment 2003      Nonalcoholic steatohepatitis    Past Surgical History:    Past Surgical History:   Procedure Laterality Date    APPENDECTOMY N/A      SECTION N/A     CHOLECYSTECTOMY      COLONOSCOPY N/A 2009    EYE SURGERY  2003    multiple       Family History:    Family History   Problem Relation Age of Onset    Lymphoma Mother     Vision loss Mother     Colon cancer Father     Hearing loss Father     Heart disease Father     Vision loss Maternal Grandmother      No family history of early onset colon cancer      Social History:    Social History     Socioeconomic History    Marital status:    Tobacco Use    Smoking status: Every Day     Current packs/day: 1.00     Average packs/day: 1 pack/day for 40.0 years (40.0 ttl pk-yrs)     Types: Cigarettes    Smokeless tobacco: Never   Vaping Use    Vaping status: Never Used   Substance and Sexual Activity    Alcohol use: Not Currently     Comment: Social only    Drug use: Never    Sexual activity: Not Currently     Partners: Male     Comment: too old       Allergies:   Allergies   Allergen Reactions    Celecoxib Hives    Doxycycline Nausea And Vomiting    Sulfa Antibiotics Other (See Comments)     Kidney infection           Medications:     Current Outpatient Medications:     famotidine (PEPCID) 10 MG tablet, Take 1 tablet by mouth 2 (Two) Times a Day., Disp: , Rfl:     vitamin D3 125 MCG (5000 UT) capsule capsule, Take 1 capsule by mouth Daily., Disp: , Rfl:     Radiology/Endoscopy:    CT scan from  reviewed by me personally: well-circumscribed 3.7 x 2.4 x 8 cm fatty mass anterior to the left iliopsoas and iliacus muscles.  There is no associated lymphadenopathy or abnormalities on the right side of the abdomen, specifically no diverticulosis, nephrolithiasis, hernias, or fluid collections.    Labs:    Labs from   reviewed by me including CBC, CMP, and urinalysis      Physical Exam:   General: not sick, awake and alert  HEENT: no scleral icterus, moist oral mucosa  Cardiac: normal rate, no JVD, no peripheral edema  Respiratory: nonlabored breathing on room air, symmetric   Abdomen: soft, mild focal tenderness to right of umbilicus, nondistended, no guarding, prior laparoscopy incisions well-healed, no palpable hernias or masses.  Skin: no jaundice, rash, or lesions visualized     Body mass index is 34.11 kg/m².         Assessment and Plan:  This is a very pleasant 63-year-old lady referred for abdominal pain and incidentally discovered left retroperitoneal mass.  While the mass has no overtly concerning features on CT scan, malignancy cannot be ruled out.  We discussed further imaging for better characterization of the retroperitoneal mass and will obtain an MRI of the abdomen and pelvis.  We also discussed screening colonoscopy and EGD in search of any abnormalities that could be related to her right-sided abdominal pain, and we will get her scheduled next week.  I will see her back in the office after MRI has been obtained to discuss further management.       Aashish Celaya M.D.  General Surgery  [facility]    [address], [address]  [place], [place], [address]  P: [phone]  F: [phone]

## 2024-08-14 ENCOUNTER — APPOINTMENT (OUTPATIENT)
Dept: CT IMAGING | Facility: HOSPITAL | Age: 64
End: 2024-08-14
Payer: COMMERCIAL

## 2024-08-14 ENCOUNTER — ANESTHESIA (OUTPATIENT)
Dept: GASTROENTEROLOGY | Facility: HOSPITAL | Age: 64
End: 2024-08-14
Payer: COMMERCIAL

## 2024-08-14 ENCOUNTER — APPOINTMENT (OUTPATIENT)
Dept: GENERAL RADIOLOGY | Facility: HOSPITAL | Age: 64
End: 2024-08-14
Payer: COMMERCIAL

## 2024-08-14 ENCOUNTER — ANESTHESIA EVENT (OUTPATIENT)
Dept: GASTROENTEROLOGY | Facility: HOSPITAL | Age: 64
End: 2024-08-14
Payer: COMMERCIAL

## 2024-08-14 ENCOUNTER — HOSPITAL ENCOUNTER (OUTPATIENT)
Facility: HOSPITAL | Age: 64
Setting detail: HOSPITAL OUTPATIENT SURGERY
Discharge: HOME | End: 2024-08-14
Attending: STUDENT IN AN ORGANIZED HEALTH CARE EDUCATION/TRAINING PROGRAM | Admitting: STUDENT IN AN ORGANIZED HEALTH CARE EDUCATION/TRAINING PROGRAM
Payer: COMMERCIAL

## 2024-08-14 VITALS
BODY MASS INDEX: 35.61 KG/M2 | SYSTOLIC BLOOD PRESSURE: 158 MMHG | DIASTOLIC BLOOD PRESSURE: 95 MMHG | OXYGEN SATURATION: 95 % | HEART RATE: 101 BPM | HEIGHT: 63 IN | TEMPERATURE: 97.6 F | RESPIRATION RATE: 16 BRPM | WEIGHT: 201 LBS

## 2024-08-14 DIAGNOSIS — R19.00 RETROPERITONEAL MASS: ICD-10-CM

## 2024-08-14 PROBLEM — K63.5 COLON POLYPS: Status: ACTIVE | Noted: 2024-08-14

## 2024-08-14 PROCEDURE — 25010000002 PROPOFOL 1000 MG/100ML EMULSION

## 2024-08-14 PROCEDURE — 25010000002 DROPERIDOL PER 5 MG

## 2024-08-14 PROCEDURE — 25810000003 LACTATED RINGERS PER 1000 ML: Performed by: STUDENT IN AN ORGANIZED HEALTH CARE EDUCATION/TRAINING PROGRAM

## 2024-08-14 PROCEDURE — 25010000002 DEXAMETHASONE PER 1 MG

## 2024-08-14 PROCEDURE — 74018 RADEX ABDOMEN 1 VIEW: CPT

## 2024-08-14 PROCEDURE — 88305 TISSUE EXAM BY PATHOLOGIST: CPT | Performed by: STUDENT IN AN ORGANIZED HEALTH CARE EDUCATION/TRAINING PROGRAM

## 2024-08-14 PROCEDURE — 94640 AIRWAY INHALATION TREATMENT: CPT

## 2024-08-14 PROCEDURE — 94664 DEMO&/EVAL PT USE INHALER: CPT

## 2024-08-14 PROCEDURE — 94799 UNLISTED PULMONARY SVC/PX: CPT

## 2024-08-14 PROCEDURE — 71045 X-RAY EXAM CHEST 1 VIEW: CPT

## 2024-08-14 PROCEDURE — 25010000002 DIPHENHYDRAMINE PER 50 MG

## 2024-08-14 PROCEDURE — 74176 CT ABD & PELVIS W/O CONTRAST: CPT

## 2024-08-14 PROCEDURE — 25010000002 GLYCOPYRROLATE 0.2 MG/ML SOLUTION

## 2024-08-14 PROCEDURE — 25010000002 ONDANSETRON PER 1 MG

## 2024-08-14 PROCEDURE — 25010000002 PROPOFOL 10 MG/ML EMULSION

## 2024-08-14 RX ORDER — GLYCOPYRROLATE 0.2 MG/ML
INJECTION INTRAMUSCULAR; INTRAVENOUS AS NEEDED
Status: DISCONTINUED | OUTPATIENT
Start: 2024-08-14 | End: 2024-08-14 | Stop reason: SURG

## 2024-08-14 RX ORDER — DEXAMETHASONE SODIUM PHOSPHATE 4 MG/ML
INJECTION, SOLUTION INTRA-ARTICULAR; INTRALESIONAL; INTRAMUSCULAR; INTRAVENOUS; SOFT TISSUE AS NEEDED
Status: DISCONTINUED | OUTPATIENT
Start: 2024-08-14 | End: 2024-08-14 | Stop reason: SURG

## 2024-08-14 RX ORDER — DROPERIDOL 2.5 MG/ML
0.62 INJECTION, SOLUTION INTRAMUSCULAR; INTRAVENOUS ONCE
Status: COMPLETED | OUTPATIENT
Start: 2024-08-14 | End: 2024-08-14

## 2024-08-14 RX ORDER — DIPHENHYDRAMINE HYDROCHLORIDE 50 MG/ML
INJECTION INTRAMUSCULAR; INTRAVENOUS AS NEEDED
Status: DISCONTINUED | OUTPATIENT
Start: 2024-08-14 | End: 2024-08-14 | Stop reason: SURG

## 2024-08-14 RX ORDER — LIDOCAINE HYDROCHLORIDE 20 MG/ML
INJECTION, SOLUTION INFILTRATION; PERINEURAL AS NEEDED
Status: DISCONTINUED | OUTPATIENT
Start: 2024-08-14 | End: 2024-08-14 | Stop reason: SURG

## 2024-08-14 RX ORDER — PROPOFOL 10 MG/ML
INJECTION, EMULSION INTRAVENOUS AS NEEDED
Status: DISCONTINUED | OUTPATIENT
Start: 2024-08-14 | End: 2024-08-14 | Stop reason: SURG

## 2024-08-14 RX ORDER — ONDANSETRON 2 MG/ML
INJECTION INTRAMUSCULAR; INTRAVENOUS AS NEEDED
Status: DISCONTINUED | OUTPATIENT
Start: 2024-08-14 | End: 2024-08-14 | Stop reason: SURG

## 2024-08-14 RX ORDER — IPRATROPIUM BROMIDE AND ALBUTEROL SULFATE 2.5; .5 MG/3ML; MG/3ML
3 SOLUTION RESPIRATORY (INHALATION)
Status: DISCONTINUED | OUTPATIENT
Start: 2024-08-14 | End: 2024-08-14 | Stop reason: HOSPADM

## 2024-08-14 RX ORDER — SODIUM CHLORIDE, SODIUM LACTATE, POTASSIUM CHLORIDE, CALCIUM CHLORIDE 600; 310; 30; 20 MG/100ML; MG/100ML; MG/100ML; MG/100ML
30 INJECTION, SOLUTION INTRAVENOUS CONTINUOUS PRN
Status: DISCONTINUED | OUTPATIENT
Start: 2024-08-14 | End: 2024-08-14 | Stop reason: HOSPADM

## 2024-08-14 RX ADMIN — SODIUM CHLORIDE, POTASSIUM CHLORIDE, SODIUM LACTATE AND CALCIUM CHLORIDE 30 ML/HR: 600; 310; 30; 20 INJECTION, SOLUTION INTRAVENOUS at 11:52

## 2024-08-14 RX ADMIN — DIPHENHYDRAMINE HYDROCHLORIDE 12.5 MG: 50 INJECTION, SOLUTION INTRAMUSCULAR; INTRAVENOUS at 12:45

## 2024-08-14 RX ADMIN — GLYCOPYRROLATE 0.1 MG: 0.2 INJECTION INTRAMUSCULAR; INTRAVENOUS at 12:19

## 2024-08-14 RX ADMIN — PROPOFOL 250 MCG/KG/MIN: 10 INJECTION, EMULSION INTRAVENOUS at 12:19

## 2024-08-14 RX ADMIN — PROPOFOL INJECTABLE EMULSION 150 MG: 10 INJECTION, EMULSION INTRAVENOUS at 12:19

## 2024-08-14 RX ADMIN — DROPERIDOL 0.62 MG: 2.5 INJECTION, SOLUTION INTRAMUSCULAR; INTRAVENOUS at 12:45

## 2024-08-14 RX ADMIN — PROPOFOL INJECTABLE EMULSION 50 MG: 10 INJECTION, EMULSION INTRAVENOUS at 13:07

## 2024-08-14 RX ADMIN — PROPOFOL INJECTABLE EMULSION 30 MG: 10 INJECTION, EMULSION INTRAVENOUS at 13:10

## 2024-08-14 RX ADMIN — ONDANSETRON 4 MG: 2 INJECTION INTRAMUSCULAR; INTRAVENOUS at 12:19

## 2024-08-14 RX ADMIN — DEXAMETHASONE SODIUM PHOSPHATE 10 MG: 4 INJECTION, SOLUTION INTRA-ARTICULAR; INTRALESIONAL; INTRAMUSCULAR; INTRAVENOUS; SOFT TISSUE at 12:45

## 2024-08-14 RX ADMIN — LIDOCAINE HYDROCHLORIDE 100 MG: 20 INJECTION, SOLUTION INFILTRATION; PERINEURAL at 12:19

## 2024-08-14 RX ADMIN — IPRATROPIUM BROMIDE AND ALBUTEROL SULFATE 3 ML: .5; 3 SOLUTION RESPIRATORY (INHALATION) at 14:18

## 2024-08-14 RX ADMIN — SODIUM CHLORIDE, POTASSIUM CHLORIDE, SODIUM LACTATE AND CALCIUM CHLORIDE: 600; 310; 30; 20 INJECTION, SOLUTION INTRAVENOUS at 13:15

## 2024-08-14 NOTE — NURSING NOTE
Patient taken to car per wheelchair by GRACIA Carter. Patient has passed a lot of flatus and feeling somewhat better.   Surgery instructions discussed and given at office visit along with chlorhexidine soap.

## 2024-08-14 NOTE — ANESTHESIA PREPROCEDURE EVALUATION
Anesthesia Evaluation     Patient summary reviewed and Nursing notes reviewed                Airway   Mallampati: III  TM distance: <3 FB  Neck ROM: limited  Dental      Pulmonary    (+) a smoker Current, COPD,  Cardiovascular     ECG reviewed  Rhythm: regular  Rate: normal    (+) hyperlipidemia      Neuro/Psych- negative ROS  GI/Hepatic/Renal/Endo    (+) morbid obesity, GERD, hepatitis, liver disease fatty liver disease    Musculoskeletal     Abdominal    Substance History - negative use     OB/GYN negative ob/gyn ROS         Other   arthritis,                   Anesthesia Plan    ASA 3     MAC     intravenous induction     Anesthetic plan, risks, benefits, and alternatives have been provided, discussed and informed consent has been obtained with: patient.    CODE STATUS:

## 2024-08-14 NOTE — NURSING NOTE
Patient had CT scan performed as ordered. Dr. Celaya here to see patient and spouse. He read the final report on computer and stated she may leave if able to keep fluids down. Patient drinking sprite and feeling okay, no emesis.

## 2024-08-14 NOTE — H&P
General Surgery  History and Physical    Patient: Juli Monteiro  YOB: 1960  MRN: [id number]      Assessment  Juli Monteiro is a 63 y.o. female at elevated risk for colorectal cancer here for screening colonoscopy and EGD.    Plan  Proceed with EGD and colonoscopy      History of Present Illness   Juli Monteiro is a 63 y.o. female referred for right-sided abdominal pain and  retroperitoneal mass on the left.  She has a family history of colon cancer in a first-degree relative.  Last colonoscopy  was reportedly normal, EGD at that time with mucosal friability.  She has a history of GERD controlled with famotidine. She has had cholecystectomy and appendectomy in the past. History of hepatic steatosis, but no splenomegaly, and no prior hysterectomy. The patient is undergoing workup for left retroperitoneal mass with MRI and tentatively subsequent targeted biopsy.    Past Medical History   Past Medical History:   Diagnosis Date    Cataract 2017    Cholelithiasis 2011    removed    History of medical problems pcos    Hyperlipidemia     Irritable bowel syndrome 2009    Visual impairment 2003        Past Surgical History   Past Surgical History:   Procedure Laterality Date    APPENDECTOMY N/A      SECTION N/A     CHOLECYSTECTOMY      COLONOSCOPY N/A 2009    EYE SURGERY  2003    multiple       Social History  Social History     Socioeconomic History    Marital status:    Tobacco Use    Smoking status: Every Day     Current packs/day: 1.00     Average packs/day: 1 pack/day for 40.0 years (40.0 ttl pk-yrs)     Types: Cigarettes    Smokeless tobacco: Never   Vaping Use    Vaping status: Never Used   Substance and Sexual Activity    Alcohol use: Not Currently     Comment: Social only    Drug use: Never    Sexual activity: Not Currently     Partners: Male     Comment: too old       Family History  Family History   Problem Relation Age of Onset    Lymphoma Mother     Vision loss Mother     Colon cancer  Father     Hearing loss Father     Heart disease Father     Vision loss Maternal Grandmother        Allergies  Allergies   Allergen Reactions    Celecoxib Hives    Doxycycline Nausea And Vomiting    Sulfa Antibiotics Other (See Comments)     Kidney infection           Medications  No current facility-administered medications for this encounter.    Current Outpatient Medications:     famotidine (PEPCID) 10 MG tablet, Take 1 tablet by mouth 2 (Two) Times a Day., Disp: , Rfl:     vitamin D3 125 MCG (5000 UT) capsule capsule, Take 1 capsule by mouth Daily., Disp: , Rfl:     Vital Signs  There were no vitals filed for this visit.     Physical Exam  General: not sick, awake and alert  Cardiac: normal rate, no JVD, no peripheral edema  Respiratory: nonlabored breathing on room air  Abdomen: soft, mild focal tenderness to right of umbilicus, nondistended, no guarding, prior laparoscopy incisions well-healed, no palpable hernias or masses.           BMI is >= 30 and <35. (Class 1 Obesity). The following options were offered after discussion;: nutrition counseling/recommendations       Aashish Celaya MD  General Surgery  [facility]    [address], [address]  [place], [place], [address]  P: [phone]  F: [phone]

## 2024-08-14 NOTE — DISCHARGE INSTRUCTIONS
For the next 24 hours patient needs to be with a responsible adult.    For 24 hours DO NOT drive, operate machinery, appliances, drink alcohol, make important decisions or sign legal documents.    Start with a light or bland diet if you are feeling sick to your stomach otherwise advance to regular diet as tolerated.    Follow recommendations on procedure report if provided by your doctor.    Call Dr Celaya  for problems [phone]    Problems may include but not limited to: large amounts of bleeding, trouble breathing, repeated vomiting, severe unrelieved pain, fever or chills.

## 2024-08-14 NOTE — NURSING NOTE
Dr Celaya at bedside @1410, Megan Singh called report to VICKI Deras in radiology, pt transported to radiology triage with Dr Celaya and pt spouse remain at bedside.

## 2024-08-14 NOTE — ANESTHESIA POSTPROCEDURE EVALUATION
Patient: Juli Monteiro    Procedure Summary       Date: 08/14/24 Room / Location:  [facility]    Anesthesia Start: 1212 Anesthesia Stop: 1333    Procedures:       COLONOSCOPY to cecum with cold forcep polypectomies      ESOPHAGOGASTRODUODENOSCOPY with biopsies (Esophagus) Diagnosis:       Retroperitoneal mass      (Retroperitoneal mass [R19.00])    Surgeons: Aashish Celaya MD Provider: Horace Juarez MD    Anesthesia Type: MAC ASA Status: 3            Anesthesia Type: MAC    Vitals  Vitals Value Taken Time   /88 08/14/24 1423   Temp     Pulse 98 08/14/24 1426   Resp 16 08/14/24 1355   SpO2 94 % 08/14/24 1426   Vitals shown include unfiled device data.        Post Anesthesia Care and Evaluation    Patient location during evaluation: PHASE II  Patient participation: complete - patient participated  Level of consciousness: awake and alert  Pain management: adequate    Airway patency: patent  Anesthetic complications: No anesthetic complications  PONV Status: none  Cardiovascular status: acceptable and hemodynamically stable  Respiratory status: acceptable, nonlabored ventilation and spontaneous ventilation  Hydration status: acceptable

## 2024-08-14 NOTE — OP NOTE
EGD and Colonoscopy Procedure Note  Juli Monteiro  1960  Date of Procedure: 08/14/24    Pre-operative Diagnosis:    Screening colonoscopy with family history of colon cancer, abdominal pain    Post-operative Diagnosis:  Colon polyps    Procedure: Esophagogastroduodenoscopy with antral biopsy and Colonoscopy with multiple biopsies    Findings/Treatments:   Small hiatal hernia, otherwise normal upper endoscopy  Antral cold forceps biopsy  3 mm cecal polyp cold forceps biopsy  4 mm transverse colon polyp cold forceps biopsy   3 mm sigmoid polyp cold forceps biopsy x 2  6 mm rectosigmoid polyp cold forceps biopsy, 4 mm rectosigmoid polyp cold forceps biopsy x 3         Recommendations:   Interval colonoscopy pending pathology results.    Surgeon: Aashish Celaya MD    Anesthetic: MAC per Horace Juarez MD      Procedure Details:    MAC anesthesia was induced.   The endoscope was introduced through the oropharynx and advanced down the esophagus into the stomach. The pylorus was visualized and traversed. Duodenal bulb mucosa appeared normal. The scope was withdrawn into the stomach. The gastric mucosa appeared normal. There were no ulcers or masses. Antral biopsy were collected along the lesser curvature and sent for ROCK test. There was a small hiatal hernia seen on retroflexion.  Squamocolumnar junction appeared normal.  There was no esophagitis.  The stomach was desufflated and the endoscope was withdrawn.     The patient was positioned in left lateral decubitus. A digital rectal exam was performed along with visual inspection of the anus. The colonoscope was inserted into the rectum and advanced to the cecum, with relative ease.  The cecum was identified by the appendiceal orifice and the ileocecal valve and photographed for documentation.    A careful inspection was made as the scope was withdrawn, including a retroflexed view of the rectum.  Cold forceps biopsies were taken of a 3 mm cecal polyp, 4 mm  transverse colon polyp, 3 mm sigmoid colon polyp, 6 mm rectosigmoid polyp, and 4 mm rectosigmoid polyp.  Hemostasis was observed. Retroflexion in the rectum revealed no hemorrhoids or masses. Prep quality was excellent.  The patient had an episode of emesis during a colonoscopy, will be observed in the postanesthesia care unit prior to tentative discharge.    Aashish Celaya M.D.  General Surgery  [facility]    [address], [address]  [place], [address]  P: [phone]  F: [phone]

## 2024-08-14 NOTE — NURSING NOTE
1405, MD called, pt very nauseous with vomitting, and close to passing out once up to the bathroom.... radiology at bedside now, CXR and KUB per MD order.

## 2024-08-14 NOTE — PROGRESS NOTES
Patient update  The patient was having more abdominal pain and distension than expected after EGD and colonoscopy as well as an episode of emesis. Her abdomen was moderately tender and markedly distended without guarding or peritonitis. Heart rate was was in the 90s, systolic pressure of 150. CXR and upright abdominal film showed no obvious free air, but colonic distension. A CT scan was obtained. There is no free air or fluid on my interpretation, will await radiologist read. The patient is in stable clinical condition and I suspect will be able to be discharged home today.

## 2024-08-15 NOTE — ADDENDUM NOTE
Addendum  created 08/14/24 2053 by Scarlett Noonan CRNA    Flowsheet accepted, Intraprocedure Event edited, Intraprocedure Flowsheets edited, Intraprocedure Meds edited

## 2024-08-16 LAB
CYTO UR: NORMAL
LAB AP CASE REPORT: NORMAL
PATH REPORT.FINAL DX SPEC: NORMAL
PATH REPORT.GROSS SPEC: NORMAL

## 2024-08-28 ENCOUNTER — HOSPITAL ENCOUNTER (OUTPATIENT)
Dept: MRI IMAGING | Facility: HOSPITAL | Age: 64
Discharge: HOME | End: 2024-08-28
Payer: COMMERCIAL

## 2024-08-28 DIAGNOSIS — R19.00 RETROPERITONEAL MASS: ICD-10-CM

## 2024-08-28 DIAGNOSIS — R19.00 RETROPERITONEAL MASS: Primary | ICD-10-CM

## 2024-08-28 PROCEDURE — A9579 GAD-BASE MR CONTRAST NOS,1ML: HCPCS | Performed by: STUDENT IN AN ORGANIZED HEALTH CARE EDUCATION/TRAINING PROGRAM

## 2024-08-28 PROCEDURE — 25010000002 GADOTERIDOL PER 1 ML: Performed by: STUDENT IN AN ORGANIZED HEALTH CARE EDUCATION/TRAINING PROGRAM

## 2024-08-28 PROCEDURE — 72197 MRI PELVIS W/O & W/DYE: CPT

## 2024-08-28 PROCEDURE — 74183 MRI ABD W/O CNTR FLWD CNTR: CPT

## 2024-08-28 RX ADMIN — GADOTERIDOL 20 ML: 279.3 INJECTION, SOLUTION INTRAVENOUS at 08:37

## 2024-08-28 NOTE — H&P (VIEW-ONLY)
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 I recommended CT-guided biopsy, staging CT scan of the chest, and surgical oncology referral to Enrico Jauregui at [facility]. She agreed to this plan.   We also discussed the pathology report from her colonoscopy demonstrating a subcentimeter tubular adenoma of the transverse colon.  Given her family history of colorectal cancer in a first-degree relative I recommended repeat colonoscopy in 5 years.

## 2024-08-28 NOTE — PROGRESS NOTES
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 I recommended CT-guided biopsy, staging CT scan of the chest, and surgical oncology referral to Enrico Jauregui at [facility]. She agreed to this plan.   We also discussed the pathology report from her colonoscopy demonstrating a subcentimeter tubular adenoma of the transverse colon.  Given her family history of colorectal cancer in a first-degree relative I recommended repeat colonoscopy in 5 years.

## 2024-08-29 ENCOUNTER — OFFICE VISIT (OUTPATIENT)
Dept: INTERNAL MEDICINE | Facility: CLINIC | Age: 64
End: 2024-08-29
Payer: COMMERCIAL

## 2024-08-29 VITALS
RESPIRATION RATE: 16 BRPM | HEART RATE: 79 BPM | SYSTOLIC BLOOD PRESSURE: 120 MMHG | HEIGHT: 63 IN | OXYGEN SATURATION: 98 % | WEIGHT: 203 LBS | BODY MASS INDEX: 35.97 KG/M2 | DIASTOLIC BLOOD PRESSURE: 60 MMHG

## 2024-08-29 DIAGNOSIS — Z00.00 ANNUAL PHYSICAL EXAM: Primary | ICD-10-CM

## 2024-08-29 DIAGNOSIS — Z11.59 NEED FOR HEPATITIS C SCREENING TEST: ICD-10-CM

## 2024-08-29 DIAGNOSIS — R73.03 PREDIABETES: ICD-10-CM

## 2024-08-29 PROCEDURE — 99396 PREV VISIT EST AGE 40-64: CPT | Performed by: NURSE PRACTITIONER

## 2024-08-29 NOTE — PROGRESS NOTES
09/05/24 0002   Pre-Procedure Phone Call   Procedure Time Verified Yes   Arrival Time 1100   Procedure Location Verified Yes   Medical History Reviewed No   NPO Status Reinforced Yes   Ride and Caregiver Arranged Yes   Patient Knows to Bring Current Medications No   Bring Outside Films Requested No

## 2024-08-29 NOTE — PROGRESS NOTES
Subjective   Juli Monteiro is a 63 y.o. female. Patient is here today for   Chief Complaint   Patient presents with    Annual Exam          Vitals:    08/29/24 0743   BP: 120/60   Pulse: 79   Resp: 16   SpO2: 98%     Body mass index is 35.96 kg/m².    The following portions of the patient's history were reviewed and updated as appropriate: allergies, current medications, past family history, past medical history, past social history, past surgical history and problem list.    Past Medical History:   Diagnosis Date    Cataract 2017    Cholelithiasis 2011    removed    History of medical problems pcos    Hyperlipidemia     Irritable bowel syndrome 2009    Visual impairment 2003      Allergies   Allergen Reactions    Celecoxib Hives    Doxycycline Nausea And Vomiting    Sulfa Antibiotics Other (See Comments)     Kidney infection          Social History     Socioeconomic History    Marital status:    Tobacco Use    Smoking status: Every Day     Current packs/day: 1.00     Average packs/day: 1 pack/day for 40.0 years (40.0 ttl pk-yrs)     Types: Cigarettes    Smokeless tobacco: Never   Vaping Use    Vaping status: Never Used   Substance and Sexual Activity    Alcohol use: Not Currently     Comment: Social only    Drug use: Never    Sexual activity: Not Currently     Partners: Male     Comment: too old        Current Outpatient Medications:     famotidine (PEPCID) 10 MG tablet, Take 1 tablet by mouth 2 (Two) Times a Day., Disp: , Rfl:     vitamin D3 125 MCG (5000 UT) capsule capsule, Take 1 capsule by mouth Daily., Disp: , Rfl:           Objective   History of Present Illness   Juli Monteiro 63 y.o. female who presents for an Annual physical exam and for a one year follow up for prediabetes and HLD. She was last seen by me in February 2023 she was seen by Dr Coleman for abdominal pain in July 2024.  She was referred to GI and general surgery for retroperitoneal mass. She was also recently referred to oncology. She had  an MRI of her abdomen and pelvis yesterday .  Plan to update vaccines if needed today.  [facility]     Health Habits:  Dental Exam. up to date  Eye Exam. up to date  Exercise: 1 times/week.  Current exercise activities include: cardiovascular workout on exercise equipment      Preventative counseling  - seat belt use for every car ride for patient and all occupants.   Encouraged sunscreen use to reduce risk of skin cancer for any days with sun exposure over 20 minutes.   -Encouraged annual dental and vision exams as part of their overall health.   -Encouraged  exercise  combined with a well-balanced diet.   -Immunizations reviewed and updated in EMR.     The 10-year ASCVD risk score (Ashkan CUEVAS, et al., 2019) is: 14.9%    Values used to calculate the score:      Age: 63 years      Sex: Female      Is Non- : No      Diabetic: Yes      Tobacco smoker: Yes      Systolic Blood Pressure: 120 mmHg      Is BP treated: No      HDL Cholesterol: 47 mg/dL      Total Cholesterol: 209 mg/dL    Lab Results (most recent)       Admission on 08/14/2024, Discharged on 08/14/2024   Component Date Value Ref Range Status    Case Report 08/14/2024    Final                    Value:Surgical Pathology Report                         Case: [id number]                                  Authorizing Provider:  Aashish Celaya MD        Collected:           08/14/2024 12:24 PM          Ordering Location:     [facility]  Received:            08/14/2024 02:06 PM                                 ENDO SUITES                                                                  Pathologist:           Yolanda Randolph MD                                                          Specimens:   1) - Gastric, Body, biopsies                                                                        2) - Large Intestine, Cecum, polyp                                                                  3) - Large  Intestine, Transverse Colon, polyp                                                       4) - Large Intestine, Sigmoid Colon, polyps                                                         5) - Large Intestine, Sigmoid Colon, recto-sigmoid polyps                                  Final Diagnosis 08/14/2024    Final                    Value:This result contains rich text formatting which cannot be displayed here.    Gross Description 08/14/2024    Final                    Value:This result contains rich text formatting which cannot be displayed here.    Microscopic Description 08/14/2024    Final                    Value:This result contains rich text formatting which cannot be displayed here.   Lab on 07/24/2024   Component Date Value Ref Range Status    Lipase 07/24/2024 25  13 - 60 U/L Final    Amylase 07/24/2024 40  28 - 100 U/L Final    Glucose 07/24/2024 92  65 - 99 mg/dL Final    BUN 07/24/2024 9  8 - 23 mg/dL Final    Creatinine 07/24/2024 0.70  0.57 - 1.00 mg/dL Final    Sodium 07/24/2024 140  136 - 145 mmol/L Final    Potassium 07/24/2024 4.2  3.5 - 5.2 mmol/L Final    Chloride 07/24/2024 104  98 - 107 mmol/L Final    CO2 07/24/2024 25.8  22.0 - 29.0 mmol/L Final    Calcium 07/24/2024 9.9  8.6 - 10.5 mg/dL Final    Total Protein 07/24/2024 7.1  6.0 - 8.5 g/dL Final    Albumin 07/24/2024 4.1  3.5 - 5.2 g/dL Final    ALT (SGPT) 07/24/2024 21  1 - 33 U/L Final    AST (SGOT) 07/24/2024 20  1 - 32 U/L Final    Alkaline Phosphatase 07/24/2024 80  39 - 117 U/L Final    Total Bilirubin 07/24/2024 <0.2  0.0 - 1.2 mg/dL Final    Globulin 07/24/2024 3.0  gm/dL Final    A/G Ratio 07/24/2024 1.4  g/dL Final    BUN/Creatinine Ratio 07/24/2024 12.9  7.0 - 25.0 Final    Anion Gap 07/24/2024 10.2  5.0 - 15.0 mmol/L Final    eGFR 07/24/2024 97.9  >60.0 mL/min/1.73 Final    WBC 07/24/2024 7.82  3.40 - 10.80 10*3/mm3 Final    RBC 07/24/2024 5.18  3.77 - 5.28 10*6/mm3 Final    Hemoglobin 07/24/2024 15.7  12.0 - 15.9 g/dL Final     Hematocrit 07/24/2024 46.9 (H)  34.0 - 46.6 % Final    MCV 07/24/2024 90.5  79.0 - 97.0 fL Final    MCH 07/24/2024 30.3  26.6 - 33.0 pg Final    MCHC 07/24/2024 33.5  31.5 - 35.7 g/dL Final    RDW 07/24/2024 11.7 (L)  12.3 - 15.4 % Final    RDW-SD 07/24/2024 38.6  37.0 - 54.0 fl Final    MPV 07/24/2024 9.1  6.0 - 12.0 fL Final    Platelets 07/24/2024 272  140 - 450 10*3/mm3 Final    Neutrophil % 07/24/2024 61.0  42.7 - 76.0 % Final    Lymphocyte % 07/24/2024 26.5  19.6 - 45.3 % Final    Monocyte % 07/24/2024 8.6  5.0 - 12.0 % Final    Eosinophil % 07/24/2024 2.7  0.3 - 6.2 % Final    Basophil % 07/24/2024 0.8  0.0 - 1.5 % Final    Immature Grans % 07/24/2024 0.4  0.0 - 0.5 % Final    Neutrophils, Absolute 07/24/2024 4.78  1.70 - 7.00 10*3/mm3 Final    Lymphocytes, Absolute 07/24/2024 2.07  0.70 - 3.10 10*3/mm3 Final    Monocytes, Absolute 07/24/2024 0.67  0.10 - 0.90 10*3/mm3 Final    Eosinophils, Absolute 07/24/2024 0.21  0.00 - 0.40 10*3/mm3 Final    Basophils, Absolute 07/24/2024 0.06  0.00 - 0.20 10*3/mm3 Final    Immature Grans, Absolute 07/24/2024 0.03  0.00 - 0.05 10*3/mm3 Final    nRBC 07/24/2024 0.0  0.0 - 0.2 /100 WBC Final    Extra Tube 07/24/2024 Hold for add-ons.   Final    Auto resulted.   Office Visit on 07/24/2024   Component Date Value Ref Range Status    Color 07/24/2024 Yellow  Yellow, Straw, Dark Yellow, Frances Final    Clarity, UA 07/24/2024 Clear  Clear Final    Specific Gravity  07/24/2024 1.010  1.005 - 1.030 Final    pH, Urine 07/24/2024 6.0  5.0 - 8.0 Final    Leukocytes 07/24/2024 Negative  Negative Final    Nitrite, UA 07/24/2024 Negative  Negative Final    Protein, POC 07/24/2024 Negative  Negative mg/dL Final    Glucose, UA 07/24/2024 Negative  Negative mg/dL Final    Ketones, UA 07/24/2024 Negative  Negative Final    Urobilinogen, UA 07/24/2024 0.2 E.U./dL  Normal, 0.2 E.U./dL Final    Bilirubin 07/24/2024 Negative  Negative Final    Blood, UA 07/24/2024 Negative  Negative Final     Lot Number 07/24/2024 [id number]   Final    Expiration Date 07/24/2024 [id number]   Final                 Health Maintenance   Topic Date Due    HEPATITIS C SCREENING  Never done    HEMOGLOBIN A1C  08/09/2023    LUNG CANCER SCREENING  09/15/2023    LIPID PANEL  02/09/2024    Pneumococcal Vaccine 0-64 (1 of 2 - PCV) 08/29/2024 (Originally 9/11/1966)    MAMMOGRAM  08/29/2024 (Originally 7/7/2024)    ZOSTER VACCINE (2 of 3) 08/29/2024 (Originally 11/8/2016)    INFLUENZA VACCINE  03/31/2025 (Originally 8/1/2024)    TDAP/TD VACCINES (3 - Tdap) 08/29/2025 (Originally 11/3/2023)    PAP SMEAR  06/01/2025    BMI FOLLOWUP  08/14/2025    ANNUAL PHYSICAL  08/29/2025    COLORECTAL CANCER SCREENING  08/14/2034    COVID-19 Vaccine  Completed    DIABETIC FOOT EXAM  Discontinued    DIABETIC EYE EXAM  Discontinued    URINE MICROALBUMIN  Discontinued           Review of Systems   HENT:  Positive for congestion, postnasal drip and voice change.    Respiratory: Negative.     Cardiovascular: Negative.    Gastrointestinal:  Positive for abdominal pain.   Genitourinary:  Negative for difficulty urinating and dysuria.   Allergic/Immunologic: Positive for environmental allergies.       Physical Exam  Vitals and nursing note reviewed.   Constitutional:       General: She is not in acute distress.     Appearance: Normal appearance. She is well-developed and well-groomed.   HENT:      Head: Normocephalic.   Cardiovascular:      Rate and Rhythm: Normal rate.   Pulmonary:      Effort: Pulmonary effort is normal.   Neurological:      Mental Status: She is alert.   Psychiatric:         Mood and Affect: Mood normal.         ASSESSMENT       Problems Addressed this Visit       Prediabetes    Relevant Orders    Hemoglobin A1c     Other Visit Diagnoses       Annual physical exam    -  Primary    Relevant Orders    Lipid Panel With LDL / HDL Ratio    Comprehensive Metabolic Panel    Need for hepatitis C screening test        Relevant Orders    Hepatitis C  Antibody          Diagnoses         Codes Comments    Annual physical exam    -  Primary ICD-10-CM: Z00.00  ICD-9-CM: V70.0     Prediabetes     ICD-10-CM: R73.03  ICD-9-CM: 790.29     Need for hepatitis C screening test     ICD-10-CM: Z11.59  ICD-9-CM: V73.89             PLAN    Age and sex appropriate physical exam performed and documented. Updated past medical, family, social and surgical histories as well as allergies and care team list.   Will check labs today and call with results  [facility]   Discussed statin due to HLD, high 10 year ascvd risk and arthrosclerosis of aorta but she declines. She wants to continue with dietary change and exercise     Return in about 1 year (around 8/29/2025) for Annual physical, with labs.  Patient was given instructions and counseling regarding her  condition for health maintenance advice.  Please see specific information pulled into the AVS if appropriate.

## 2024-08-30 ENCOUNTER — TELEPHONE (OUTPATIENT)
Dept: INTERNAL MEDICINE | Facility: CLINIC | Age: 64
End: 2024-08-30
Payer: COMMERCIAL

## 2024-08-30 LAB
ALBUMIN SERPL-MCNC: 4.2 G/DL (ref 3.5–5.2)
ALBUMIN/GLOB SERPL: 1.8 G/DL
ALP SERPL-CCNC: 78 U/L (ref 39–117)
ALT SERPL-CCNC: 21 U/L (ref 1–33)
AST SERPL-CCNC: 21 U/L (ref 1–32)
BILIRUB SERPL-MCNC: 0.4 MG/DL (ref 0–1.2)
BUN SERPL-MCNC: 10 MG/DL (ref 8–23)
BUN/CREAT SERPL: 13.3 (ref 7–25)
CALCIUM SERPL-MCNC: 9.5 MG/DL (ref 8.6–10.5)
CHLORIDE SERPL-SCNC: 103 MMOL/L (ref 98–107)
CHOLEST SERPL-MCNC: 233 MG/DL (ref 0–200)
CO2 SERPL-SCNC: 26.6 MMOL/L (ref 22–29)
CREAT SERPL-MCNC: 0.75 MG/DL (ref 0.57–1)
EGFRCR SERPLBLD CKD-EPI 2021: 90.1 ML/MIN/1.73
GLOBULIN SER CALC-MCNC: 2.4 GM/DL
GLUCOSE SERPL-MCNC: 113 MG/DL (ref 65–99)
HBA1C MFR BLD: 5.9 % (ref 4.8–5.6)
HCV IGG SERPL QL IA: NON REACTIVE
HDLC SERPL-MCNC: 44 MG/DL (ref 40–60)
LDLC SERPL CALC-MCNC: 152 MG/DL (ref 0–100)
LDLC/HDLC SERPL: 3.39 {RATIO}
POTASSIUM SERPL-SCNC: 4.3 MMOL/L (ref 3.5–5.2)
PROT SERPL-MCNC: 6.6 G/DL (ref 6–8.5)
SODIUM SERPL-SCNC: 140 MMOL/L (ref 136–145)
TRIGL SERPL-MCNC: 200 MG/DL (ref 0–150)
VLDLC SERPL CALC-MCNC: 37 MG/DL (ref 5–40)

## 2024-08-30 NOTE — TELEPHONE ENCOUNTER
----- Message from Ethelfawn Conley sent at 8/30/2024  1:20 PM EDT -----  Please call Ms Cayetano and let her know I reviewed her labs  A1c is in the prediabetic range -   Tc and LDL are elevated, statin was discussed at her last visit but she declined  Hep c screening negative   Kidney and liver function are normal

## 2024-09-05 ENCOUNTER — HOSPITAL ENCOUNTER (OUTPATIENT)
Dept: CT IMAGING | Facility: HOSPITAL | Age: 64
Discharge: HOME | End: 2024-09-05
Payer: COMMERCIAL

## 2024-09-05 VITALS
HEART RATE: 70 BPM | OXYGEN SATURATION: 98 % | TEMPERATURE: 98 F | WEIGHT: 198 LBS | DIASTOLIC BLOOD PRESSURE: 74 MMHG | RESPIRATION RATE: 14 BRPM | BODY MASS INDEX: 33.8 KG/M2 | SYSTOLIC BLOOD PRESSURE: 100 MMHG | HEIGHT: 64 IN

## 2024-09-05 DIAGNOSIS — R19.00 RETROPERITONEAL MASS: ICD-10-CM

## 2024-09-05 LAB
BASOPHILS # BLD AUTO: 0.06 10*3/MM3 (ref 0–0.2)
BASOPHILS NFR BLD AUTO: 0.9 % (ref 0–1.5)
DEPRECATED RDW RBC AUTO: 40.5 FL (ref 37–54)
EOSINOPHIL # BLD AUTO: 0.18 10*3/MM3 (ref 0–0.4)
EOSINOPHIL NFR BLD AUTO: 2.7 % (ref 0.3–6.2)
ERYTHROCYTE [DISTWIDTH] IN BLOOD BY AUTOMATED COUNT: 12.1 % (ref 12.3–15.4)
HCT VFR BLD AUTO: 43.2 % (ref 34–46.6)
HGB BLD-MCNC: 14.5 G/DL (ref 12–15.9)
IMM GRANULOCYTES # BLD AUTO: 0.02 10*3/MM3 (ref 0–0.05)
IMM GRANULOCYTES NFR BLD AUTO: 0.3 % (ref 0–0.5)
INR PPP: 1.1 (ref 0.8–1.2)
LYMPHOCYTES # BLD AUTO: 1.83 10*3/MM3 (ref 0.7–3.1)
LYMPHOCYTES NFR BLD AUTO: 27.3 % (ref 19.6–45.3)
MCH RBC QN AUTO: 30.7 PG (ref 26.6–33)
MCHC RBC AUTO-ENTMCNC: 33.6 G/DL (ref 31.5–35.7)
MCV RBC AUTO: 91.3 FL (ref 79–97)
MONOCYTES # BLD AUTO: 0.57 10*3/MM3 (ref 0.1–0.9)
MONOCYTES NFR BLD AUTO: 8.5 % (ref 5–12)
NEUTROPHILS NFR BLD AUTO: 4.05 10*3/MM3 (ref 1.7–7)
NEUTROPHILS NFR BLD AUTO: 60.3 % (ref 42.7–76)
NRBC BLD AUTO-RTO: 0 /100 WBC (ref 0–0.2)
PLATELET # BLD AUTO: 246 10*3/MM3 (ref 140–450)
PMV BLD AUTO: 9.1 FL (ref 6–12)
PROTHROMBIN TIME: 10.8 SECONDS (ref 12.8–15.2)
RBC # BLD AUTO: 4.73 10*6/MM3 (ref 3.77–5.28)
WBC NRBC COR # BLD AUTO: 6.71 10*3/MM3 (ref 3.4–10.8)

## 2024-09-05 PROCEDURE — 25010000002 MIDAZOLAM PER 1 MG: Performed by: RADIOLOGY

## 2024-09-05 PROCEDURE — 77012 CT SCAN FOR NEEDLE BIOPSY: CPT

## 2024-09-05 PROCEDURE — 85610 PROTHROMBIN TIME: CPT

## 2024-09-05 PROCEDURE — 88307 TISSUE EXAM BY PATHOLOGIST: CPT | Performed by: STUDENT IN AN ORGANIZED HEALTH CARE EDUCATION/TRAINING PROGRAM

## 2024-09-05 PROCEDURE — 25010000002 FENTANYL CITRATE (PF) 50 MCG/ML SOLUTION: Performed by: RADIOLOGY

## 2024-09-05 PROCEDURE — 25010000002 LIDOCAINE 1 % SOLUTION: Performed by: RADIOLOGY

## 2024-09-05 PROCEDURE — 71250 CT THORAX DX C-: CPT

## 2024-09-05 PROCEDURE — 85025 COMPLETE CBC W/AUTO DIFF WBC: CPT | Performed by: RADIOLOGY

## 2024-09-05 RX ORDER — SODIUM CHLORIDE 9 MG/ML
40 INJECTION, SOLUTION INTRAVENOUS AS NEEDED
Status: DISCONTINUED | OUTPATIENT
Start: 2024-09-05 | End: 2024-09-06 | Stop reason: HOSPADM

## 2024-09-05 RX ORDER — LIDOCAINE HYDROCHLORIDE 10 MG/ML
20 INJECTION, SOLUTION INFILTRATION; PERINEURAL ONCE
Status: COMPLETED | OUTPATIENT
Start: 2024-09-05 | End: 2024-09-05

## 2024-09-05 RX ORDER — SODIUM CHLORIDE 0.9 % (FLUSH) 0.9 %
3 SYRINGE (ML) INJECTION EVERY 12 HOURS SCHEDULED
Status: DISCONTINUED | OUTPATIENT
Start: 2024-09-05 | End: 2024-09-06 | Stop reason: HOSPADM

## 2024-09-05 RX ORDER — SODIUM CHLORIDE 9 MG/ML
25 INJECTION, SOLUTION INTRAVENOUS ONCE
Status: COMPLETED | OUTPATIENT
Start: 2024-09-05 | End: 2024-09-05

## 2024-09-05 RX ORDER — MIDAZOLAM HYDROCHLORIDE 1 MG/ML
INJECTION INTRAMUSCULAR; INTRAVENOUS AS NEEDED
Status: DISCONTINUED | OUTPATIENT
Start: 2024-09-05 | End: 2024-09-06 | Stop reason: HOSPADM

## 2024-09-05 RX ORDER — FENTANYL CITRATE 50 UG/ML
INJECTION, SOLUTION INTRAMUSCULAR; INTRAVENOUS AS NEEDED
Status: DISCONTINUED | OUTPATIENT
Start: 2024-09-05 | End: 2024-09-06 | Stop reason: HOSPADM

## 2024-09-05 RX ORDER — SODIUM CHLORIDE 0.9 % (FLUSH) 0.9 %
10 SYRINGE (ML) INJECTION AS NEEDED
Status: DISCONTINUED | OUTPATIENT
Start: 2024-09-05 | End: 2024-09-06 | Stop reason: HOSPADM

## 2024-09-05 RX ADMIN — Medication 3 ML: at 12:50

## 2024-09-05 RX ADMIN — FENTANYL CITRATE 50 MCG: 50 INJECTION, SOLUTION INTRAMUSCULAR; INTRAVENOUS at 13:09

## 2024-09-05 RX ADMIN — MIDAZOLAM 1 MG: 1 INJECTION INTRAMUSCULAR; INTRAVENOUS at 13:09

## 2024-09-05 RX ADMIN — LIDOCAINE HYDROCHLORIDE 20 ML: 10 INJECTION, SOLUTION INFILTRATION; PERINEURAL at 13:10

## 2024-09-05 RX ADMIN — SODIUM CHLORIDE 25 ML/HR: 9 INJECTION, SOLUTION INTRAVENOUS at 12:50

## 2024-09-05 NOTE — DISCHARGE INSTRUCTIONS
EDUCATION /DISCHARGE INSTRUCTIONS  CT/US guided biopsy:  A biopsy is a procedure done to remove tissue for further analysis.  Before images are taken to locate the target area.  Images can be obtained using ultrasound, CT or MRI.  A physician will clean your skin with antiseptic soap, place a sterile towel around the site and administer a local anesthetic to numb the area.  The physician will then insert a special needle.  Sometimes images are taken of the needle after it is inserted to ensure the needle is in the correct area to be biopsied.   A sample is obtained and sent to the laboratory for study.  Occasionally the laboratory is unable to make a diagnosis from the sample and the procedure may need to be repeated.  Within a week the radiologist will send a report to your physician.  A pathologist will also examine the tissue and send a report.    Risks of the procedure include but are not limited to:   *  Bleeding    *  Infection   *  Puncture of surrounding organs *  Death     *  Lung collapse if the biopsy is near the chest which may require insertion of a chest tube to re-inflate the lung if severe.    Benefits of the procedure:  Using x-ray helps to locate the area that requires a biopsy. The procedure is less invasive than a surgical procedure, there are no large incisions and it does not require anesthesia.    Alternatives to the procedure:  A biopsy can be performed surgically.  Risks of a surgical biopsy include exposure to anesthesia, infection, excessive bleeding and injury to abdominal organs.  A benefit of surgical biopsy is the ability to see the area to be biopsied and remove of a larger piece of tissue.    THIS EDUCATION INFORMATION WAS REVIEWED PRIOR TO PROCEDURE AND CONSENT. Post Procedure:    *  Expect the biopsy site may be tender up to one week.    *  Rest today (no pushing pulling or straining).   *  Slowly increase activity tomorrow.    *  If you received sedation do not drive for 24  hours.   *  Keep dressing clean and dry.   *  Leave dressing on puncture site for 24 hours.    *  You may shower when dressing removed.  Call your doctor if experiencing:   *  Signs of infection such as redness, swelling, excessive pain and / or foul        smelling drainage from the puncture site.   *  Chills or fever over 101 degrees (by mouth).   *  Unrelieved pain.   *  Any new or severe symptoms.   *  If experiencing sudden / severe shortness of breath or chest pain go to the       nearest emergency room.   Following the procedure:     Follow-up with the ordering physician as directed.    Continue to take other medications as directed by your physician unless    otherwise instructed.   If you have any concerns please call the Radiology Nurses Desk at [phone].  You are the most important factor in your recovery.  Follow the above instructions carefully.

## 2024-09-05 NOTE — NURSING NOTE
1426-Pt tolerated procedure well with no c/o.  Post instructions reviewed with all questions and concerns addressed to pt's satisfaction.  Transported pt to discharge area, via w/c, where Kyle, , was waiting to take pt home.

## 2024-09-06 ENCOUNTER — TELEPHONE (OUTPATIENT)
Dept: INTERVENTIONAL RADIOLOGY/VASCULAR | Facility: HOSPITAL | Age: 64
End: 2024-09-06
Payer: COMMERCIAL

## 2024-09-06 LAB
CYTO UR: NORMAL
DX PRELIMINARY: NORMAL
LAB AP CASE REPORT: NORMAL
LAB AP CLINICAL INFORMATION: NORMAL
LAB AP DIAGNOSIS COMMENT: NORMAL
PATH REPORT.GROSS SPEC: NORMAL

## 2024-09-11 LAB
CYTO UR: NORMAL
DX PRELIMINARY: NORMAL
LAB AP CASE REPORT: NORMAL
LAB AP CLINICAL INFORMATION: NORMAL
LAB AP DIAGNOSIS COMMENT: NORMAL
PATH REPORT.FINAL DX SPEC: NORMAL
PATH REPORT.GROSS SPEC: NORMAL

## 2024-09-26 ENCOUNTER — OFFICE VISIT (OUTPATIENT)
Dept: INTERNAL MEDICINE | Facility: CLINIC | Age: 64
End: 2024-09-26
Payer: COMMERCIAL

## 2024-09-26 ENCOUNTER — TELEPHONE (OUTPATIENT)
Dept: INTERNAL MEDICINE | Facility: CLINIC | Age: 64
End: 2024-09-26

## 2024-09-26 VITALS
TEMPERATURE: 98 F | RESPIRATION RATE: 18 BRPM | BODY MASS INDEX: 35.79 KG/M2 | DIASTOLIC BLOOD PRESSURE: 80 MMHG | HEART RATE: 66 BPM | WEIGHT: 202 LBS | OXYGEN SATURATION: 98 % | HEIGHT: 63 IN | SYSTOLIC BLOOD PRESSURE: 118 MMHG

## 2024-09-26 DIAGNOSIS — R05.1 ACUTE COUGH: ICD-10-CM

## 2024-09-26 DIAGNOSIS — U07.1 COVID-19: Primary | ICD-10-CM

## 2024-09-26 PROBLEM — E78.00 HYPERCHOLESTEROLEMIA: Status: ACTIVE | Noted: 2024-09-11

## 2024-09-26 PROBLEM — H40.9 GLAUCOMA: Status: ACTIVE | Noted: 2024-09-11

## 2024-09-26 PROBLEM — D49.0: Status: ACTIVE | Noted: 2024-09-12

## 2024-09-26 PROBLEM — K58.9 IRRITABLE BOWEL SYNDROME: Status: ACTIVE | Noted: 2024-09-11

## 2024-09-26 PROCEDURE — 99213 OFFICE O/P EST LOW 20 MIN: CPT | Performed by: NURSE PRACTITIONER

## 2024-09-26 RX ORDER — PREDNISONE 20 MG/1
60 TABLET ORAL DAILY
Qty: 9 TABLET | Refills: 0 | Status: SHIPPED | OUTPATIENT
Start: 2024-09-26 | End: 2024-09-29

## 2024-09-26 RX ORDER — ALBUTEROL SULFATE 90 UG/1
2 INHALANT RESPIRATORY (INHALATION) EVERY 4 HOURS PRN
Qty: 18 G | Refills: 3 | Status: SHIPPED | OUTPATIENT
Start: 2024-09-26

## 2024-09-26 RX ORDER — AZITHROMYCIN 250 MG/1
TABLET, FILM COATED ORAL
Qty: 6 TABLET | Refills: 0 | Status: SHIPPED | OUTPATIENT
Start: 2024-09-26

## 2024-10-09 ENCOUNTER — TELEPHONE (OUTPATIENT)
Dept: INTERNAL MEDICINE | Facility: CLINIC | Age: 64
End: 2024-10-09

## 2024-10-09 NOTE — TELEPHONE ENCOUNTER
I asked her to follow up with me in a week for a recheck before her surgery but she cancelled her appt on 10/7/24. Chest xray was normal  Bronchitis typically is viral and it may take 4-6 weeks to improve. Recommend she follow up if needed and cancel her surgery if needed

## 2024-10-09 NOTE — TELEPHONE ENCOUNTER
Caller: Cayetano Juli L    Relationship: Self    Best call back number: [phone]    What is the best time to reach you: ANYTIME     Who are you requesting to speak with (clinical staff, provider,  specific staff member): JUAN     Do you know the name of the person who called: PATIENT     What was the call regarding: SHE IS SUPPOSED TO BE HAVING SURGERY AND HER BRONCHITIS ISNT GETTING ANY BETTER. SHE WAS ADVISED BY HER [place] DOCTOR TO CALL JUAN AND SEE WHAT SHE CAN DO TO GET BETTER FASTER.   ALSO THE ANTIBIOTIC SHE WAS GIVEN AND SHE HAS FINISHED IT.     Is it okay if the provider responds through MyChart: NO

## 2024-10-09 NOTE — TELEPHONE ENCOUNTER
Called and spoke with patient.   She is doing and feeling much better, not completely over it but she knows it takes some time. She declined a follow up appointment as she is scheduled for surgery Monday, she said by then she will be ok.

## 2024-10-15 ENCOUNTER — READMISSION MANAGEMENT (OUTPATIENT)
Dept: CALL CENTER | Facility: HOSPITAL | Age: 64
End: 2024-10-15
Payer: COMMERCIAL

## 2024-10-16 ENCOUNTER — TRANSITIONAL CARE MANAGEMENT TELEPHONE ENCOUNTER (OUTPATIENT)
Dept: CALL CENTER | Facility: HOSPITAL | Age: 64
End: 2024-10-16
Payer: COMMERCIAL

## 2024-10-16 NOTE — OUTREACH NOTE
Call Center TCM Note      Flowsheet Row Responses   [facility] patient discharged from? Non-   Does the patient have one of the following disease processes/diagnoses(primary or secondary)? Other   TCM attempt successful? 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 Yes   Is the patient having any side effects they believe may be caused by any medication additions or changes? No   Does the patient have all medications ordered at discharge? Yes   Is the patient taking all medications as directed (includes completed medication regime)? Yes   Does the patient have an appointment with their PCP within 7-14 days of discharge? No   Nursing Interventions Patient desires to follow up with specialty only, Routed TCM call to PCP office, Patient declined scheduling/rescheduling appointment at this time   Has home health visited the patient within 72 hours of discharge? N/A   Psychosocial issues? No   Did the patient receive a copy of their discharge instructions? Yes   Nursing interventions Reviewed instructions with patient   What is the patient's perception of their health status since discharge? Improving   Is the patient/caregiver able to teach back signs and symptoms related to disease process for when to call PCP? Yes   Is the patient/caregiver able to teach back signs and symptoms related to disease process for when to call 911? Yes   Is the patient/caregiver able to teach back the hierarchy of who to call/visit for symptoms/problems? PCP, Specialist, Home health nurse, Urgent Care, ED, 911 Yes   TCM call completed? Yes   Wrap up additional comments D/C DX: Radical resection of left retroperitoneal liposarcoma - Pt doing fair, she is tired and moderate post op pain. Pt cannot take the Oxycodone 5mg, too strong so managing on Tylenol. No questions. First POST OP appt is 10/31/2024. Pt declines TCM  APPT with PCP MANDEEP Conley but will reach out if appt  needed.   Call end time 151            Beth Gardner MA    10/16/2024, 15:16 EDT

## 2024-10-16 NOTE — OUTREACH NOTE
Prep Survey      Flowsheet Row Responses   [facility]? Non-[facility]   Is LACE score < 7 ? Non-BH Discharge   Eligibility [facility]   Date of Admission 10/14/24   Date of Discharge 10/15/24   Discharge Disposition Home or Self Care   Discharge diagnosis Radical resection of left retroperitoneal liposarcoma   Does the patient have one of the following disease processes/diagnoses(primary or secondary)? General Surgery   Does the patient have Home health ordered? No   Prep survey completed? Yes            NAIMA GONZALEZ - Registered Nurse

## 2024-12-16 ENCOUNTER — TELEPHONE (OUTPATIENT)
Dept: INTERNAL MEDICINE | Facility: CLINIC | Age: 64
End: 2024-12-16
Payer: COMMERCIAL

## 2024-12-16 NOTE — TELEPHONE ENCOUNTER
Provider: JUAN LOPEZ    Caller: LYNETTE QUIJANO    Relationship to Patient: PATIENT    Pharmacy:        [facility] [id number] - [facility] [address] AT [facility] - [phone]  - [phone]  [phone]       Phone Number: [phone]    Reason for Call: PATIENT IS CALLING TO SCHEDULE AN APPOINTMENT TODAY FOR SORE THROAT, HEADACHE AND CONGESTION.  SHE STATES SHE HAD COVID ON 09/21/24.  SHE STATES SHE HAS RADIATION THERAPY TODAY AT 2:00.  SHE IS WANTING TO KNOW IF SHE CAN GET A PRESCRIPTION FOR A ZPAK.  SHE STATES HER IMMUNE SYSTEM IS VERY WEAK.  THERE ARE NO AVAILABLE APPOINTMENTS TODAY.    PLEASE ADVISE.

## 2024-12-18 ENCOUNTER — OFFICE VISIT (OUTPATIENT)
Dept: INTERNAL MEDICINE | Facility: CLINIC | Age: 64
End: 2024-12-18
Payer: COMMERCIAL

## 2024-12-18 VITALS
HEART RATE: 115 BPM | HEIGHT: 63 IN | TEMPERATURE: 98.5 F | WEIGHT: 202 LBS | DIASTOLIC BLOOD PRESSURE: 76 MMHG | SYSTOLIC BLOOD PRESSURE: 120 MMHG | BODY MASS INDEX: 35.79 KG/M2 | OXYGEN SATURATION: 98 %

## 2024-12-18 DIAGNOSIS — C48.0 RETROPERITONEAL SARCOMA: ICD-10-CM

## 2024-12-18 DIAGNOSIS — J06.9 UPPER RESPIRATORY TRACT INFECTION, UNSPECIFIED TYPE: ICD-10-CM

## 2024-12-18 DIAGNOSIS — J20.8 ACUTE BRONCHITIS DUE TO OTHER SPECIFIED ORGANISMS: Primary | ICD-10-CM

## 2024-12-18 PROBLEM — H40.053 BILATERAL OCULAR HYPERTENSION: Status: ACTIVE | Noted: 2021-10-12

## 2024-12-18 PROBLEM — R19.00 RETROPERITONEAL MASS: Status: RESOLVED | Noted: 2024-08-07 | Resolved: 2024-12-18

## 2024-12-18 PROCEDURE — 87428 SARSCOV & INF VIR A&B AG IA: CPT | Performed by: INTERNAL MEDICINE

## 2024-12-18 PROCEDURE — 99213 OFFICE O/P EST LOW 20 MIN: CPT | Performed by: INTERNAL MEDICINE

## 2024-12-18 RX ORDER — GABAPENTIN 100 MG/1
100 CAPSULE ORAL 3 TIMES DAILY
COMMUNITY
Start: 2024-12-18

## 2024-12-18 RX ORDER — AZITHROMYCIN 250 MG/1
TABLET, FILM COATED ORAL
Qty: 6 TABLET | Refills: 0 | Status: SHIPPED | OUTPATIENT
Start: 2024-12-18

## 2024-12-18 NOTE — PROGRESS NOTES
Subjective     Juli Monteiro is a 64 y.o. female who presents with   Chief Complaint   Patient presents with    Cough    Nasal Congestion    URI    Fever       Cough  Associated symptoms include a fever.   URI   Associated symptoms include coughing.   Fever   Associated symptoms include coughing.        She woke up Sunday morning at 0300 feeling ill.  HA, sore throat, head congestion.  Now with cough productive of purulent mucous.  Fever up to 100.5.  SOA and chest tightness.  Hurts to cough.      She is undergoing radiation treatment for sarcoma.      Review of Systems   Constitutional:  Positive for fever.   Respiratory:  Positive for cough.        The following portions of the patient's history were reviewed and updated as appropriate: allergies, current medications and problem list.    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  The cataracts are mild and have minimal impact on vision at this time.      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 Brother with OHT and thick corneas.   S/P SLT OU; 2003 OU, 2013 OU, did not tolerate Timolol, Combigan, Latanoprost, Lumigan  S/P Laser Peripheral Iridotomy (LPI) OU; Angles open.  OD: goniotomy with OMNI 8/9/23      AR (allergic rhinitis) 12/26/2013    Tobacco abuse 11/03/2013    AGUAYO (nonalcoholic steatohepatitis) 03/11/2013       Current Outpatient Medications on File Prior  "to Visit   Medication Sig Dispense Refill    albuterol sulfate  (90 Base) MCG/ACT inhaler Inhale 2 puffs Every 4 (Four) Hours As Needed for Wheezing. 18 g 3    famotidine (PEPCID) 10 MG tablet Take 1 tablet by mouth 2 (Two) Times a Day.      vitamin D3 125 MCG (5000 UT) capsule capsule Take 1 capsule by mouth Daily.      [DISCONTINUED] azithromycin (Zithromax Z-Rachid) 250 MG tablet Take 2 tablets the first day, then 1 tablet daily for 4 days. 6 tablet 0    gabapentin (NEURONTIN) 100 MG capsule Take 1 capsule by mouth 3 (Three) Times a Day.       No current facility-administered medications on file prior to visit.       Objective     /76   Pulse 115   Temp 98.5 °F (36.9 °C)   Ht 160 cm (62.99\")   Wt 91.6 kg (202 lb)   SpO2 98%   BMI 35.79 kg/m²     Physical Exam  Constitutional:       Appearance: She is well-developed.   HENT:      Head: Normocephalic and atraumatic.      Right Ear: Hearing and tympanic membrane normal.      Left Ear: Hearing and tympanic membrane normal.      Mouth/Throat:      Pharynx: No oropharyngeal exudate or posterior oropharyngeal erythema.   Cardiovascular:      Rate and Rhythm: Normal rate and regular rhythm.      Heart sounds: Normal heart sounds.   Pulmonary:      Effort: Pulmonary effort is normal.      Breath sounds: Normal breath sounds.   Skin:     General: Skin is warm and dry.   Neurological:      Mental Status: She is alert and oriented to person, place, and time.   Psychiatric:         Behavior: Behavior normal.         Assessment & Plan   Diagnoses and all orders for this visit:    1. Acute bronchitis due to other specified organisms (Primary)    2. Upper respiratory tract infection, unspecified type  -     POCT SARS-CoV-2 Antigen ANGEL + Flu    3. Retroperitoneal sarcoma    Other orders  -     azithromycin (Zithromax Z-Rachid) 250 MG tablet; Take 2 tablets by mouth on day 1, then 1 tablet daily on days 2-5  Dispense: 6 tablet; Refill: 0        Discussion    Patient " presents with episode of acute bronchitis.  A prescription for antibiotics is provided today.  The patient is instructed to take along with Mucinex DM.  Let me know they are not feeling better over the next 3 days or if there is any change in symptoms.           Future Appointments   Date Time Provider Department Center   8/29/2025  9:00 AM [facility]   9/4/2025  1:15 PM Ethel Conley APRN [facility]

## 2025-01-31 ENCOUNTER — TELEPHONE (OUTPATIENT)
Dept: INTERNAL MEDICINE | Facility: CLINIC | Age: 65
End: 2025-01-31
Payer: COMMERCIAL

## 2025-01-31 NOTE — TELEPHONE ENCOUNTER
Caller: Juli Monteiro    Relationship: Self    Best call back number: [phone]    What is the medical concern/diagnosis: PAIN FROM SURGERY AND HAVING METAL CLIPS IN HER STOMACH    What specialty or service is being requested: PAIN MANAGEMENT       Any additional details: PLEASE ADVISE

## 2025-04-25 ENCOUNTER — OFFICE VISIT (OUTPATIENT)
Dept: INTERNAL MEDICINE | Facility: CLINIC | Age: 65
End: 2025-04-25
Payer: COMMERCIAL

## 2025-04-25 VITALS
DIASTOLIC BLOOD PRESSURE: 80 MMHG | SYSTOLIC BLOOD PRESSURE: 130 MMHG | WEIGHT: 209 LBS | TEMPERATURE: 97.8 F | BODY MASS INDEX: 37.03 KG/M2 | OXYGEN SATURATION: 95 % | HEART RATE: 79 BPM | HEIGHT: 63 IN

## 2025-04-25 DIAGNOSIS — J86.9 ABSCESS OF CHEST: ICD-10-CM

## 2025-04-25 DIAGNOSIS — R73.03 PREDIABETES: Primary | ICD-10-CM

## 2025-04-25 RX ORDER — PREGABALIN 25 MG/1
CAPSULE ORAL
COMMUNITY
Start: 2025-04-23

## 2025-04-25 RX ORDER — SULFAMETHOXAZOLE AND TRIMETHOPRIM 800; 160 MG/1; MG/1
1 TABLET ORAL 2 TIMES DAILY
Qty: 14 TABLET | Refills: 0 | Status: SHIPPED | OUTPATIENT
Start: 2025-04-25 | End: 2025-05-02

## 2025-04-25 NOTE — PROGRESS NOTES
Subjective   Juli Monteiro is a 64 y.o. female. Patient is here today for   Chief Complaint   Patient presents with    Right breast mass x 3 weeks    Prediabetes            Vitals:    04/25/25 1443   BP: 130/80   Pulse: 79   Temp: 97.8 °F (36.6 °C)   SpO2: 95%     Body mass index is 37.03 kg/m².  The following portions of the patient's history were reviewed and updated as appropriate: allergies, current medications, past family history, past medical history, past social history, past surgical history and problem list.    History of Present Illness  The patient presents for evaluation of an abscess on her chest and for a follow up for prediabetes     Currently, issues with the hip bone, including inflammation that prevents sitting upright, are experienced. Consultation with Dr. Jauregui, a surgeon, on Wednesday suggested the possibility of a hip replacement. [facility] for further evaluation has been made. Gabapentin is not currently being taken.    Back pain and swelling, which have improved since the last visit, are also reported. Diagnosed with sciatica, a steroid injection was offered but declined due to personal preference and concerns about potential side effects. Consideration is being given to trying CBD for pain management. Pain is planned to be managed with Tylenol during the day and Lyrica 25 mg at night.      Lyrica was prescribed for pain management but was discontinued due to its sedative effects. No sleep disturbances are reported, and pain subsides when lying down. Pain is experienced while standing or walking. During the second visit in 03/2025, gummies were given but were ineffective.        Past Medical History:   Diagnosis Date    Cataract 2017    Cholelithiasis 2011    removed    History of medical problems pcos    Hyperlipidemia     Irritable bowel syndrome 2009    Visual impairment 2003      Allergies   Allergen Reactions    Celecoxib Hives    Doxycycline Nausea And Vomiting     Morphine Other (See Comments)      Social History     Socioeconomic History    Marital status:    Tobacco Use    Smoking status: Every Day     Current packs/day: 1.00     Average packs/day: 1 pack/day for 40.0 years (40.0 ttl pk-yrs)     Types: Cigarettes    Smokeless tobacco: Never   Vaping Use    Vaping status: Never Used   Substance and Sexual Activity    Alcohol use: Not Currently     Comment: Social only    Drug use: Never    Sexual activity: Not Currently     Partners: Male     Comment: too old        Current Outpatient Medications:     albuterol sulfate  (90 Base) MCG/ACT inhaler, Inhale 2 puffs Every 4 (Four) Hours As Needed for Wheezing., Disp: 18 g, Rfl: 3    azithromycin (Zithromax Z-Rachid) 250 MG tablet, Take 2 tablets by mouth on day 1, then 1 tablet daily on days 2-5, Disp: 6 tablet, Rfl: 0    famotidine (PEPCID) 10 MG tablet, Take 1 tablet by mouth 2 (Two) Times a Day., Disp: , Rfl:     pregabalin (LYRICA) 25 MG capsule, , Disp: , Rfl:     vitamin D3 125 MCG (5000 UT) capsule capsule, Take 1 capsule by mouth Daily., Disp: , Rfl:     sulfamethoxazole-trimethoprim (Bactrim DS) 800-160 MG per tablet, Take 1 tablet by mouth 2 (Two) Times a Day for 7 days., Disp: 14 tablet, Rfl: 0   Review of Systems   Constitutional:  Positive for fatigue.   Respiratory: Negative.     Cardiovascular: Negative.    Musculoskeletal:  Positive for arthralgias and back pain.   Skin:         Abscess of right upper chest x 3 weeks, attempted to drain at home and improved but still there        Objective     A1C Last 3 Results          8/29/2024    08:20   HGBA1C Last 3 Results   Hemoglobin A1C 5.90        Physical Exam  Vitals and nursing note reviewed.   Cardiovascular:      Rate and Rhythm: Normal rate and regular rhythm.   Pulmonary:      Effort: Pulmonary effort is normal.      Breath sounds: Normal breath sounds.   Neurological:      Mental Status: She is alert.         Assessment    Diagnoses and all orders for  this visit:    1. Prediabetes (Primary)  -     Hemoglobin A1c  -     Comprehensive Metabolic Panel    2. Abscess of chest    Other orders  -     sulfamethoxazole-trimethoprim (Bactrim DS) 800-160 MG per tablet; Take 1 tablet by mouth 2 (Two) Times a Day for 7 days.  Dispense: 14 tablet; Refill: 0    Incision & Drainage    Date/Time: 4/25/2025 4:02 PM    Performed by: Ethel Conley APRN  Authorized by: Ethel Conley APRN  Consent: Verbal consent obtained  Consent given by: patient  Patient understanding: patient states understanding of the procedure being performed  Patient identity confirmed: verbally with patient  Type: abscess  Body area: trunk  Location details: chest  Anesthesia: local infiltration    Anesthesia:  Local Anesthetic: lidocaine 1% without epinephrine  Scalpel size: 11  Incision type: single straight  Drainage characteristics: milky and sanguinous.  Packing material: none  Patient tolerance: patient tolerated the procedure well with no immediate complications  Comments: Assisted by Shaista WATTERS,             Assessment & Plan  1. Abscess.  - An abscess was identified during the examination.  - The abscess was be drained today and pt tolerated without difficulty   Keep the area clean and dry  Keep covered if draining  Take bactrim as prescribed- pt has tolerated sulfa drugs in the past and state the work best for her   Advised to follow up if no improvement in symptoms in the next week  Go to the ER over the weekend if fever, chills/body aches, increased redness     2. Hip pain.  - Reports significant hip pain and inflammation, making it difficult to sit or stand comfortably.  - Physical examination findings include difficulty sitting up straight and inflammation in the hip area.  - Referral to  [facility] for further evaluation and potential hip replacement surgery.  - Continues with Tylenol during the day and Lyrica 25 mg at night; will try CBD products for pain management. Follow up with  pain management as scheduled       4. Prediabetes.  - A1c level was last checked on 08/29/2024, and it was within the prediabetic range at 5.9.  - Review of records confirmed previous A1c result and communication.  - Discussion included the potential impact of elevated A1c on healing.  - A repeat A1c test will be conducted today to monitor blood sugar levels.        Return for Recheck, as scheduled. Or sooner if needed     Patient or patient representative verbalized consent for the use of Ambient Listening during the visit with  MANDEEP Jaffe for chart documentation. 4/25/2025  14:55 EDT

## 2025-04-26 LAB
ALBUMIN SERPL-MCNC: 4.3 G/DL (ref 3.5–5.2)
ALBUMIN/GLOB SERPL: 1.5 G/DL
ALP SERPL-CCNC: 100 U/L (ref 39–117)
ALT SERPL-CCNC: 20 U/L (ref 1–33)
AST SERPL-CCNC: 24 U/L (ref 1–32)
BILIRUB SERPL-MCNC: 0.3 MG/DL (ref 0–1.2)
BUN SERPL-MCNC: 8 MG/DL (ref 8–23)
BUN/CREAT SERPL: 12.1 (ref 7–25)
CALCIUM SERPL-MCNC: 9.7 MG/DL (ref 8.6–10.5)
CHLORIDE SERPL-SCNC: 103 MMOL/L (ref 98–107)
CO2 SERPL-SCNC: 26.1 MMOL/L (ref 22–29)
CREAT SERPL-MCNC: 0.66 MG/DL (ref 0.57–1)
EGFRCR SERPLBLD CKD-EPI 2021: 98.7 ML/MIN/1.73
GLOBULIN SER CALC-MCNC: 2.8 GM/DL
GLUCOSE SERPL-MCNC: 91 MG/DL (ref 65–99)
HBA1C MFR BLD: 6.2 % (ref 4.8–5.6)
POTASSIUM SERPL-SCNC: 3.9 MMOL/L (ref 3.5–5.2)
PROT SERPL-MCNC: 7.1 G/DL (ref 6–8.5)
SODIUM SERPL-SCNC: 138 MMOL/L (ref 136–145)

## 2025-08-21 DIAGNOSIS — R73.03 PREDIABETES: ICD-10-CM

## 2025-08-21 DIAGNOSIS — Z00.00 ROUTINE HEALTH MAINTENANCE: Primary | ICD-10-CM

## 2025-08-29 LAB
ALBUMIN SERPL-MCNC: 4.1 G/DL (ref 3.5–5.2)
ALBUMIN/GLOB SERPL: 1.6 G/DL
ALP SERPL-CCNC: 87 U/L (ref 39–117)
ALT SERPL-CCNC: 23 U/L (ref 1–33)
AST SERPL-CCNC: 24 U/L (ref 1–32)
BASOPHILS # BLD AUTO: 0.04 10*3/MM3 (ref 0–0.2)
BASOPHILS NFR BLD AUTO: 0.6 % (ref 0–1.5)
BILIRUB SERPL-MCNC: 0.3 MG/DL (ref 0–1.2)
BUN SERPL-MCNC: 7 MG/DL (ref 8–23)
BUN/CREAT SERPL: 9.1 (ref 7–25)
CALCIUM SERPL-MCNC: 9.9 MG/DL (ref 8.6–10.5)
CHLORIDE SERPL-SCNC: 103 MMOL/L (ref 98–107)
CHOLEST SERPL-MCNC: 242 MG/DL (ref 0–200)
CO2 SERPL-SCNC: 23.9 MMOL/L (ref 22–29)
CREAT SERPL-MCNC: 0.77 MG/DL (ref 0.57–1)
EGFRCR SERPLBLD CKD-EPI 2021: 86.8 ML/MIN/1.73
EOSINOPHIL # BLD AUTO: 0.2 10*3/MM3 (ref 0–0.4)
EOSINOPHIL NFR BLD AUTO: 2.9 % (ref 0.3–6.2)
ERYTHROCYTE [DISTWIDTH] IN BLOOD BY AUTOMATED COUNT: 12 % (ref 12.3–15.4)
GLOBULIN SER CALC-MCNC: 2.5 GM/DL
GLUCOSE SERPL-MCNC: 141 MG/DL (ref 65–99)
HBA1C MFR BLD: 6.1 % (ref 4.8–5.6)
HCT VFR BLD AUTO: 47 % (ref 34–46.6)
HDLC SERPL-MCNC: 43 MG/DL (ref 40–60)
HGB BLD-MCNC: 15.4 G/DL (ref 12–15.9)
IMM GRANULOCYTES # BLD AUTO: 0.02 10*3/MM3 (ref 0–0.05)
IMM GRANULOCYTES NFR BLD AUTO: 0.3 % (ref 0–0.5)
LDLC SERPL CALC-MCNC: 170 MG/DL (ref 0–100)
LDLC/HDLC SERPL: 3.9 {RATIO}
LYMPHOCYTES # BLD AUTO: 0.99 10*3/MM3 (ref 0.7–3.1)
LYMPHOCYTES NFR BLD AUTO: 14.5 % (ref 19.6–45.3)
MCH RBC QN AUTO: 30.1 PG (ref 26.6–33)
MCHC RBC AUTO-ENTMCNC: 32.8 G/DL (ref 31.5–35.7)
MCV RBC AUTO: 91.8 FL (ref 79–97)
MONOCYTES # BLD AUTO: 0.53 10*3/MM3 (ref 0.1–0.9)
MONOCYTES NFR BLD AUTO: 7.7 % (ref 5–12)
NEUTROPHILS # BLD AUTO: 5.06 10*3/MM3 (ref 1.7–7)
NEUTROPHILS NFR BLD AUTO: 74 % (ref 42.7–76)
NRBC BLD AUTO-RTO: 0 /100 WBC (ref 0–0.2)
PLATELET # BLD AUTO: 278 10*3/MM3 (ref 140–450)
POTASSIUM SERPL-SCNC: 4.1 MMOL/L (ref 3.5–5.2)
PROT SERPL-MCNC: 6.6 G/DL (ref 6–8.5)
RBC # BLD AUTO: 5.12 10*6/MM3 (ref 3.77–5.28)
SODIUM SERPL-SCNC: 139 MMOL/L (ref 136–145)
T4 FREE SERPL-MCNC: 1.22 NG/DL (ref 0.93–1.7)
TRIGL SERPL-MCNC: 157 MG/DL (ref 0–150)
TSH SERPL DL<=0.005 MIU/L-ACNC: 5.62 UIU/ML (ref 0.27–4.2)
VLDLC SERPL CALC-MCNC: 29 MG/DL (ref 5–40)
WBC # BLD AUTO: 6.84 10*3/MM3 (ref 3.4–10.8)